# Patient Record
Sex: FEMALE | Race: WHITE | Employment: UNEMPLOYED | URBAN - METROPOLITAN AREA
[De-identification: names, ages, dates, MRNs, and addresses within clinical notes are randomized per-mention and may not be internally consistent; named-entity substitution may affect disease eponyms.]

---

## 2017-04-01 ENCOUNTER — HOSPITAL ENCOUNTER (OUTPATIENT)
Dept: BONE DENSITY | Age: 47
Discharge: HOME | End: 2017-04-01
Attending: FAMILY MEDICINE
Payer: COMMERCIAL

## 2017-04-01 ENCOUNTER — HOSPITAL ENCOUNTER (OUTPATIENT)
Dept: MAMMOGRAPHY | Age: 47
Discharge: HOME | End: 2017-04-01
Attending: FAMILY MEDICINE
Payer: COMMERCIAL

## 2017-04-01 DIAGNOSIS — Z13.820 SCREENING FOR OSTEOPOROSIS: ICD-10-CM

## 2017-04-01 DIAGNOSIS — Z12.31 ENCOUNTER FOR SCREENING MAMMOGRAM FOR MALIGNANT NEOPLASM OF BREAST: ICD-10-CM

## 2017-04-01 PROCEDURE — 77067 SCR MAMMO BI INCL CAD: CPT

## 2017-04-01 PROCEDURE — 77080 DXA BONE DENSITY AXIAL: CPT

## 2017-04-11 ENCOUNTER — HOSPITAL ENCOUNTER (OUTPATIENT)
Dept: ULTRASOUND IMAGING | Facility: HOSPITAL | Age: 47
Discharge: HOME | End: 2017-04-11
Attending: FAMILY MEDICINE
Payer: COMMERCIAL

## 2017-04-11 DIAGNOSIS — E04.1 THYROID NODULE: ICD-10-CM

## 2017-04-11 PROCEDURE — 76536 US EXAM OF HEAD AND NECK: CPT

## 2017-10-02 ENCOUNTER — LAB ENCOUNTER (OUTPATIENT)
Dept: LAB | Facility: HOSPITAL | Age: 47
End: 2017-10-02
Attending: FAMILY MEDICINE
Payer: COMMERCIAL

## 2017-10-02 DIAGNOSIS — E03.9 HYPOTHYROIDISM: Primary | ICD-10-CM

## 2017-10-02 PROCEDURE — 84443 ASSAY THYROID STIM HORMONE: CPT

## 2017-10-02 PROCEDURE — 84439 ASSAY OF FREE THYROXINE: CPT

## 2017-10-02 PROCEDURE — 36415 COLL VENOUS BLD VENIPUNCTURE: CPT

## 2018-02-13 ENCOUNTER — OFFICE VISIT (OUTPATIENT)
Dept: FAMILY MEDICINE CLINIC | Facility: CLINIC | Age: 48
End: 2018-02-13

## 2018-02-13 VITALS
SYSTOLIC BLOOD PRESSURE: 90 MMHG | BODY MASS INDEX: 26.68 KG/M2 | OXYGEN SATURATION: 97 % | WEIGHT: 145 LBS | HEART RATE: 85 BPM | DIASTOLIC BLOOD PRESSURE: 70 MMHG | HEIGHT: 62 IN

## 2018-02-13 DIAGNOSIS — E03.9 HYPOTHYROIDISM, UNSPECIFIED TYPE: Primary | ICD-10-CM

## 2018-02-13 DIAGNOSIS — Z01.89 ENCOUNTER FOR ROUTINE LABORATORY TESTING: ICD-10-CM

## 2018-02-13 PROCEDURE — 99202 OFFICE O/P NEW SF 15 MIN: CPT

## 2018-02-13 RX ORDER — LEVOTHYROXINE SODIUM 0.05 MG/1
50 TABLET ORAL
Qty: 30 TABLET | Refills: 0 | Status: SHIPPED | OUTPATIENT
Start: 2018-02-13 | End: 2018-03-14

## 2018-02-13 RX ORDER — LEVOTHYROXINE SODIUM 0.05 MG/1
50 TABLET ORAL
Refills: 5 | COMMUNITY
Start: 2017-12-20 | End: 2018-02-13

## 2018-02-14 NOTE — PROGRESS NOTES
HPI:    Patient ID: Ruben Neal is a 49year old female. Thyroid Problem   This is a chronic problem. The current episode started more than 1 year ago (5/15/15). The problem occurs constantly. Progression since onset: stable.  Associated symptoms incl regular rhythm and normal heart sounds. Pulmonary/Chest: Effort normal and breath sounds normal. No respiratory distress. Neurological: She is alert and oriented to person, place, and time. Skin: Skin is warm. No rash noted.    Nursing note and vital

## 2018-02-14 NOTE — PATIENT INSTRUCTIONS
Hipotiroidismo    A usted le fitzpatrick diagnosticado hipotiroidismo. Triumph significa que pinzon glándula tiroides no está produciendo suficiente hormona tiroidea. Esta hormona es importante para el crecimiento del cuerpo y el metabolismo.  Si no tiene suficiente, mu · [place] mattie pastillas de hormona tiroidea exactamente kai le indicó pinzon proveedor de [place]. En la [employer] de los casos es derick pastilla por día con el estómago vacío.  Use un pastillero con los días de la semana para ayudarse a recordar guy pinzon pas © 8236-2743 [address]. [address] [facility], [address] [facility]. Todos los derechos reservados. Esta información no pretende sustituir la atención médica profesional. Sólo pinzon médico puede diagnosticar y tratar un problema de ellen.

## 2018-03-01 ENCOUNTER — APPOINTMENT (OUTPATIENT)
Dept: LAB | Facility: HOSPITAL | Age: 48
End: 2018-03-01
Payer: COMMERCIAL

## 2018-03-01 DIAGNOSIS — E03.9 HYPOTHYROIDISM, UNSPECIFIED TYPE: ICD-10-CM

## 2018-03-01 DIAGNOSIS — Z01.89 ENCOUNTER FOR ROUTINE LABORATORY TESTING: ICD-10-CM

## 2018-03-01 LAB
ALBUMIN SERPL BCP-MCNC: 4 G/DL (ref 3.5–4.8)
ALBUMIN/GLOB SERPL: 1.3 {RATIO} (ref 1–2)
ALP SERPL-CCNC: 90 U/L (ref 32–100)
ALT SERPL-CCNC: 13 U/L (ref 14–54)
ANION GAP SERPL CALC-SCNC: 5 MMOL/L (ref 0–18)
AST SERPL-CCNC: 20 U/L (ref 15–41)
BILIRUB SERPL-MCNC: 1 MG/DL (ref 0.3–1.2)
BILIRUB UR QL: NEGATIVE
BUN SERPL-MCNC: 10 MG/DL (ref 8–20)
BUN/CREAT SERPL: 17.5 (ref 10–20)
CALCIUM SERPL-MCNC: 9.2 MG/DL (ref 8.5–10.5)
CHLORIDE SERPL-SCNC: 105 MMOL/L (ref 95–110)
CHOLEST SERPL-MCNC: 227 MG/DL (ref 110–200)
CLARITY UR: CLEAR
CO2 SERPL-SCNC: 27 MMOL/L (ref 22–32)
COLOR UR: YELLOW
CREAT SERPL-MCNC: 0.57 MG/DL (ref 0.5–1.5)
ERYTHROCYTE [DISTWIDTH] IN BLOOD BY AUTOMATED COUNT: 13.3 % (ref 11–15)
GLOBULIN PLAS-MCNC: 3 G/DL (ref 2.5–3.7)
GLUCOSE SERPL-MCNC: 99 MG/DL (ref 70–99)
GLUCOSE UR-MCNC: NEGATIVE MG/DL
HCT VFR BLD AUTO: 40.4 % (ref 35–48)
HDLC SERPL-MCNC: 69 MG/DL
HGB BLD-MCNC: 13.3 G/DL (ref 12–16)
HGB UR QL STRIP.AUTO: NEGATIVE
KETONES UR-MCNC: NEGATIVE MG/DL
LDLC SERPL CALC-MCNC: 137 MG/DL (ref 0–99)
MCH RBC QN AUTO: 29.8 PG (ref 27–32)
MCHC RBC AUTO-ENTMCNC: 33 G/DL (ref 32–37)
MCV RBC AUTO: 90.5 FL (ref 80–100)
NITRITE UR QL STRIP.AUTO: NEGATIVE
NONHDLC SERPL-MCNC: 158 MG/DL
OSMOLALITY UR CALC.SUM OF ELEC: 283 MOSM/KG (ref 275–295)
PATIENT FASTING: YES
PH UR: 6 [PH] (ref 5–8)
PLATELET # BLD AUTO: 240 K/UL (ref 140–400)
PMV BLD AUTO: 8.4 FL (ref 7.4–10.3)
POTASSIUM SERPL-SCNC: 4.1 MMOL/L (ref 3.3–5.1)
PROT SERPL-MCNC: 7 G/DL (ref 5.9–8.4)
PROT UR-MCNC: NEGATIVE MG/DL
RBC # BLD AUTO: 4.46 M/UL (ref 3.7–5.4)
RBC #/AREA URNS AUTO: 6 /HPF
SODIUM SERPL-SCNC: 137 MMOL/L (ref 136–144)
SP GR UR STRIP: 1.02 (ref 1–1.03)
TRIGL SERPL-MCNC: 103 MG/DL (ref 1–149)
TSH SERPL-ACNC: 4.25 UIU/ML (ref 0.45–5.33)
UROBILINOGEN UR STRIP-ACNC: <2
VIT C UR-MCNC: NEGATIVE MG/DL
WBC # BLD AUTO: 5.3 K/UL (ref 4–11)
WBC #/AREA URNS AUTO: 29 /HPF

## 2018-03-01 PROCEDURE — 36415 COLL VENOUS BLD VENIPUNCTURE: CPT

## 2018-03-01 PROCEDURE — 80061 LIPID PANEL: CPT

## 2018-03-01 PROCEDURE — 81001 URINALYSIS AUTO W/SCOPE: CPT

## 2018-03-01 PROCEDURE — 80053 COMPREHEN METABOLIC PANEL: CPT

## 2018-03-01 PROCEDURE — 85027 COMPLETE CBC AUTOMATED: CPT

## 2018-03-01 PROCEDURE — 84443 ASSAY THYROID STIM HORMONE: CPT

## 2018-03-12 ENCOUNTER — TELEPHONE (OUTPATIENT)
Dept: FAMILY MEDICINE CLINIC | Facility: CLINIC | Age: 48
End: 2018-03-12

## 2018-03-12 DIAGNOSIS — E03.9 HYPOTHYROIDISM, UNSPECIFIED TYPE: ICD-10-CM

## 2018-03-12 RX ORDER — LEVOTHYROXINE SODIUM 0.05 MG/1
TABLET ORAL
Qty: 30 TABLET | Refills: 0 | OUTPATIENT
Start: 2018-03-12

## 2018-03-12 NOTE — TELEPHONE ENCOUNTER
----- Message from Olga Barlow MD sent at 3/3/2018 10:30 AM CST -----  Needs f/u appointment to review results and annual physical exam.

## 2018-03-14 ENCOUNTER — OFFICE VISIT (OUTPATIENT)
Dept: FAMILY MEDICINE CLINIC | Facility: CLINIC | Age: 48
End: 2018-03-14

## 2018-03-14 VITALS
HEART RATE: 69 BPM | WEIGHT: 140 LBS | DIASTOLIC BLOOD PRESSURE: 66 MMHG | OXYGEN SATURATION: 98 % | SYSTOLIC BLOOD PRESSURE: 94 MMHG | BODY MASS INDEX: 26 KG/M2

## 2018-03-14 DIAGNOSIS — Z13.31 DEPRESSION SCREENING: ICD-10-CM

## 2018-03-14 DIAGNOSIS — Z12.31 ENCOUNTER FOR SCREENING MAMMOGRAM FOR BREAST CANCER: ICD-10-CM

## 2018-03-14 DIAGNOSIS — E03.9 HYPOTHYROIDISM, UNSPECIFIED TYPE: ICD-10-CM

## 2018-03-14 DIAGNOSIS — E78.00 HYPERCHOLESTEREMIA: ICD-10-CM

## 2018-03-14 DIAGNOSIS — R14.0 ABDOMINAL DISTENSION (GASEOUS): ICD-10-CM

## 2018-03-14 DIAGNOSIS — E66.3 OVERWEIGHT: ICD-10-CM

## 2018-03-14 DIAGNOSIS — Z01.411 ENCOUNTER FOR GYNECOLOGICAL EXAMINATION WITH ABNORMAL FINDING: Primary | ICD-10-CM

## 2018-03-14 PROCEDURE — 99212 OFFICE O/P EST SF 10 MIN: CPT

## 2018-03-14 PROCEDURE — 87624 HPV HI-RISK TYP POOLED RSLT: CPT

## 2018-03-14 PROCEDURE — 99396 PREV VISIT EST AGE 40-64: CPT

## 2018-03-14 RX ORDER — DICYCLOMINE HYDROCHLORIDE 10 MG/1
10 CAPSULE ORAL 4 TIMES DAILY PRN
Qty: 90 CAPSULE | Refills: 0 | Status: SHIPPED | OUTPATIENT
Start: 2018-03-14 | End: 2019-04-12 | Stop reason: ALTCHOICE

## 2018-03-14 RX ORDER — LEVOTHYROXINE SODIUM 0.05 MG/1
50 TABLET ORAL
Qty: 90 TABLET | Refills: 1 | Status: SHIPPED | OUTPATIENT
Start: 2018-03-14 | End: 2018-08-28

## 2018-03-14 NOTE — PROGRESS NOTES
CC: Annual Physical Exam    HPI:   Lynn Degroot is a 49year old female who presents for a complete physical exam. Symptoms: denies discharge, itching, burning or dysuria, is menopausal. Patient complains of intermittent episodes of bloating for the past 149 mg/dL   Non HDL Chol 158 (H) <130 mg/dL   LDL Cholesterol 137 (H) 0 - 99 mg/dL   -TSH W REFLEX TO FREE T4   Result Value Ref Range   TSH 4.25 0.45 - 5.33 uIU/mL   -URINALYSIS, ROUTINE   Result Value Ref Range   Urine Color Yellow Yellow   Clarity Urine Smokeless tobacco: Never Used                      Alcohol use: No              Occ: housewife. : yes. Children: 2 daughters and 1 son.    Exercise: minimal.  Diet: watches minimally     REVIEW OF SYSTEMS:   CONSTITUTIONAL:  Denies unusual we apparent distress. HEENT:  Head:  Normocephalic, atraumatic Eyes: EOMI, PERRLA, no scleral icterus, conjunctivae clear bilaterally, no eye discharge Ears: TM's clear and intact bilaterally, no excess cerumen or erythema.  Nose: patent, no nasal discharge T Clinical course, prognosis, and treatment options of disease process discussed with pt.  - Dicyclomine HCl (BENTYL) 10 MG Oral Cap; Take 1 capsule (10 mg total) by mouth 4 (four) times daily as needed. Dispense: 90 capsule; Refill: 0    3.  Hypercholestere

## 2018-03-15 NOTE — PATIENT INSTRUCTIONS
Pautas de prevención para mujeres de entre 36 y 52 años de edad  [address] detección y las vacunas son importantes para el manejo de pinzon ellen.  La consejería sobre pinzon ellen también es esencial. A continuación, verá pautas en relación con esos temas p Clamidia Todas las mujeres con mayor riesgo de infección En los exámenes de henny si tiene riesgo o si tiene síntomas   Depresión Todas las mujeres de ann agueda de edad En los exámenes de henny   [address] sexualmente activas con mayor riesgo Hepatitis B Las mujeres con mayor riesgo de infección; hable con pinzon proveedor de [employer] dosis a lo matias de seis meses; la segunda dosis debería aplicarse un mes después de la primera dosis; la tercera dosis debería aplicarse al [place] Uso del tabaco y los efectos que puede tener sobre la ellen Todas las mujeres de ann agueda de edad Todos los exámenes   1American Diabetes Association  2American Cancer Society  3U. S.  Preventive Services Task Force  [address] Academy of Ophthalmology  [place]

## 2018-03-16 LAB — HPV I/H RISK 1 DNA SPEC QL NAA+PROBE: NEGATIVE

## 2018-04-03 ENCOUNTER — HOSPITAL ENCOUNTER (OUTPATIENT)
Dept: MAMMOGRAPHY | Facility: HOSPITAL | Age: 48
Discharge: HOME | End: 2018-04-03
Payer: COMMERCIAL

## 2018-04-03 DIAGNOSIS — Z12.31 ENCOUNTER FOR SCREENING MAMMOGRAM FOR BREAST CANCER: ICD-10-CM

## 2018-04-03 PROCEDURE — 77067 SCR MAMMO BI INCL CAD: CPT

## 2018-07-26 ENCOUNTER — OFFICE VISIT (OUTPATIENT)
Dept: FAMILY MEDICINE CLINIC | Facility: CLINIC | Age: 48
End: 2018-07-26
Payer: COMMERCIAL

## 2018-07-26 VITALS
WEIGHT: 143 LBS | OXYGEN SATURATION: 98 % | DIASTOLIC BLOOD PRESSURE: 74 MMHG | SYSTOLIC BLOOD PRESSURE: 106 MMHG | HEART RATE: 63 BPM | BODY MASS INDEX: 26 KG/M2

## 2018-07-26 DIAGNOSIS — M54.42 CHRONIC LEFT-SIDED LOW BACK PAIN WITH LEFT-SIDED SCIATICA: Primary | ICD-10-CM

## 2018-07-26 DIAGNOSIS — K13.79 LESION OF HARD PALATE: ICD-10-CM

## 2018-07-26 DIAGNOSIS — G89.29 CHRONIC LEFT-SIDED LOW BACK PAIN WITH LEFT-SIDED SCIATICA: Primary | ICD-10-CM

## 2018-07-26 DIAGNOSIS — F51.04 PSYCHOPHYSIOLOGICAL INSOMNIA: ICD-10-CM

## 2018-07-26 PROBLEM — Z12.31 ENCOUNTER FOR SCREENING MAMMOGRAM FOR BREAST CANCER: Status: RESOLVED | Noted: 2018-03-14 | Resolved: 2018-07-26

## 2018-07-26 PROBLEM — Z13.31 DEPRESSION SCREENING: Status: RESOLVED | Noted: 2018-03-14 | Resolved: 2018-07-26

## 2018-07-26 PROBLEM — Z01.411 ENCOUNTER FOR GYNECOLOGICAL EXAMINATION WITH ABNORMAL FINDING: Status: RESOLVED | Noted: 2018-03-14 | Resolved: 2018-07-26

## 2018-07-26 PROCEDURE — 99214 OFFICE O/P EST MOD 30 MIN: CPT

## 2018-07-26 RX ORDER — NAPROXEN 500 MG/1
500 TABLET ORAL 2 TIMES DAILY WITH MEALS
Qty: 60 TABLET | Refills: 0 | Status: SHIPPED | OUTPATIENT
Start: 2018-07-26 | End: 2019-06-20

## 2018-07-26 RX ORDER — CYCLOBENZAPRINE HCL 10 MG
10 TABLET ORAL 3 TIMES DAILY PRN
Qty: 15 TABLET | Refills: 0 | Status: SHIPPED | OUTPATIENT
Start: 2018-07-26 | End: 2019-06-20 | Stop reason: ALTCHOICE

## 2018-07-27 NOTE — PROGRESS NOTES
HPI:    Patient ID: June Herndon is a 51year old female. Also c/o trouble falling and staying asleep for the past 2 months following the death of her parents.  She states that prior to this she spent the past few years taking care of them and not slee unexpected weight change and weight loss. HENT: Negative for congestion, ear pain, rhinorrhea and sore throat. Eyes: Negative for photophobia, discharge and visual disturbance. Respiratory: Negative for cough and shortness of breath.     Concetta Benitez erythematous and raised oval shaped area on mid anterior aspect of hard palate) present. Cardiovascular: Normal rate, regular rhythm and normal heart sounds. Pulmonary/Chest: Effort normal and breath sounds normal. No respiratory distress.    [address]

## 2018-07-27 NOTE — PATIENT INSTRUCTIONS
El Insomnio [Insomnia]  El insomnio se refiere a dificultad para dormirse, mantenerse dormido o ambos.  El insomnio tiene muchas causas, incluyendo ansiedad, estrés, depresión, dolor crónico, ciclos de sueño fuera de equilibrio debido a trabajo de turno n Seguimiento con pinzon médico o de acuerdo a las indicaciones de nuestro personal si siente que el insomnio no responde a las indicaciones descritas arriba.   Busque Prontamente Atención Médica  si algo de lo siguiente ocurre:  · Inquietud extrema o irritabilid

## 2018-08-27 ENCOUNTER — NURSE ONLY (OUTPATIENT)
Dept: FAMILY MEDICINE CLINIC | Facility: CLINIC | Age: 48
End: 2018-08-27
Payer: COMMERCIAL

## 2018-08-27 DIAGNOSIS — E78.00 HYPERCHOLESTEREMIA: ICD-10-CM

## 2018-08-27 DIAGNOSIS — E03.9 HYPOTHYROIDISM, UNSPECIFIED TYPE: ICD-10-CM

## 2018-08-27 LAB
ALBUMIN SERPL BCP-MCNC: 4 G/DL (ref 3.5–4.8)
ALBUMIN/GLOB SERPL: 1.5 {RATIO} (ref 1–2)
ALP SERPL-CCNC: 85 U/L (ref 32–100)
ALT SERPL-CCNC: 13 U/L (ref 14–54)
ANION GAP SERPL CALC-SCNC: 6 MMOL/L (ref 0–18)
AST SERPL-CCNC: 21 U/L (ref 15–41)
BILIRUB SERPL-MCNC: 0.8 MG/DL (ref 0.3–1.2)
BUN SERPL-MCNC: 11 MG/DL (ref 8–20)
BUN/CREAT SERPL: 19.6 (ref 10–20)
CALCIUM SERPL-MCNC: 8.7 MG/DL (ref 8.5–10.5)
CHLORIDE SERPL-SCNC: 104 MMOL/L (ref 95–110)
CHOLEST SERPL-MCNC: 231 MG/DL (ref 110–200)
CO2 SERPL-SCNC: 23 MMOL/L (ref 22–32)
CREAT SERPL-MCNC: 0.56 MG/DL (ref 0.5–1.5)
GLOBULIN PLAS-MCNC: 2.7 G/DL (ref 2.5–3.7)
GLUCOSE SERPL-MCNC: 99 MG/DL (ref 70–99)
HDLC SERPL-MCNC: 77 MG/DL
LDLC SERPL CALC-MCNC: 133 MG/DL (ref 0–99)
NONHDLC SERPL-MCNC: 154 MG/DL
OSMOLALITY UR CALC.SUM OF ELEC: 275 MOSM/KG (ref 275–295)
PATIENT FASTING: YES
POTASSIUM SERPL-SCNC: 3.8 MMOL/L (ref 3.3–5.1)
PROT SERPL-MCNC: 6.7 G/DL (ref 5.9–8.4)
SODIUM SERPL-SCNC: 133 MMOL/L (ref 136–144)
TRIGL SERPL-MCNC: 103 MG/DL (ref 1–149)
TSH SERPL-ACNC: 1.5 UIU/ML (ref 0.45–5.33)

## 2018-08-27 PROCEDURE — 84443 ASSAY THYROID STIM HORMONE: CPT

## 2018-08-27 PROCEDURE — 80061 LIPID PANEL: CPT

## 2018-08-27 PROCEDURE — 36415 COLL VENOUS BLD VENIPUNCTURE: CPT

## 2018-08-27 PROCEDURE — 80053 COMPREHEN METABOLIC PANEL: CPT

## 2018-08-27 NOTE — PROGRESS NOTES
Patient presented to clinic for Lipid, TSH blood work. Orders verified in patient chart. Name and  verified. Patient is fasting. Blood drawn from the left dorsal hand, tolerated well without complications.

## 2018-11-26 ENCOUNTER — APPOINTMENT (OUTPATIENT)
Dept: LAB | Facility: HOSPITAL | Age: 48
End: 2018-11-26
Payer: COMMERCIAL

## 2018-11-26 DIAGNOSIS — E78.00 HYPERCHOLESTEREMIA: ICD-10-CM

## 2018-11-26 PROCEDURE — 80053 COMPREHEN METABOLIC PANEL: CPT

## 2018-11-26 PROCEDURE — 80061 LIPID PANEL: CPT

## 2018-11-26 PROCEDURE — 36415 COLL VENOUS BLD VENIPUNCTURE: CPT

## 2018-11-27 DIAGNOSIS — E78.00 HYPERCHOLESTEREMIA: ICD-10-CM

## 2018-11-27 PROBLEM — R73.01 IMPAIRED FASTING GLUCOSE: Status: ACTIVE | Noted: 2018-11-26

## 2018-11-27 RX ORDER — SIMVASTATIN 20 MG
20 TABLET ORAL NIGHTLY
Qty: 90 TABLET | Refills: 2 | Status: SHIPPED | OUTPATIENT
Start: 2018-11-27 | End: 2019-03-11

## 2019-03-04 ENCOUNTER — TELEPHONE (OUTPATIENT)
Dept: FAMILY MEDICINE CLINIC | Facility: CLINIC | Age: 49
End: 2019-03-04

## 2019-03-04 DIAGNOSIS — E03.9 HYPOTHYROIDISM, UNSPECIFIED TYPE: ICD-10-CM

## 2019-03-04 RX ORDER — LEVOTHYROXINE SODIUM 0.05 MG/1
50 TABLET ORAL
Qty: 10 TABLET | Refills: 0 | Status: SHIPPED | OUTPATIENT
Start: 2019-03-04 | End: 2019-03-11

## 2019-03-04 NOTE — TELEPHONE ENCOUNTER
Patient is calling asking for a refill on her thyroid medication for 1 week. She states she is waiting on her insurance card to come in. Once received will come in to get blood work and follow up with Dr. Karen Gruber.

## 2019-03-11 ENCOUNTER — TELEPHONE (OUTPATIENT)
Dept: FAMILY MEDICINE CLINIC | Facility: CLINIC | Age: 49
End: 2019-03-11

## 2019-03-11 DIAGNOSIS — E78.00 HYPERCHOLESTEREMIA: ICD-10-CM

## 2019-03-11 DIAGNOSIS — E03.9 HYPOTHYROIDISM, UNSPECIFIED TYPE: ICD-10-CM

## 2019-03-11 RX ORDER — LEVOTHYROXINE SODIUM 0.05 MG/1
50 TABLET ORAL
Qty: 30 TABLET | Refills: 0 | Status: SHIPPED | OUTPATIENT
Start: 2019-03-11 | End: 2019-04-12

## 2019-03-11 RX ORDER — SIMVASTATIN 20 MG
20 TABLET ORAL NIGHTLY
Qty: 30 TABLET | Refills: 0 | Status: SHIPPED | OUTPATIENT
Start: 2019-03-11 | End: 2019-04-10

## 2019-03-11 NOTE — TELEPHONE ENCOUNTER
Patient is calling stating she called her insurance to verify when she will be active. She was told it started April 1st. Patient is requesting thyroid and cholesterol refills for 1 month if possible.

## 2019-03-11 NOTE — TELEPHONE ENCOUNTER
1 month supply authorized by MD, sufficient until her insurance becomes active April 1st.  E-rx to pharmacy.

## 2019-04-03 ENCOUNTER — TELEPHONE (OUTPATIENT)
Dept: FAMILY MEDICINE CLINIC | Facility: CLINIC | Age: 49
End: 2019-04-03

## 2019-04-03 DIAGNOSIS — Z00.00 LABORATORY EXAMINATION ORDERED AS PART OF A ROUTINE GENERAL MEDICAL EXAMINATION: Primary | ICD-10-CM

## 2019-04-03 DIAGNOSIS — E03.9 HYPOTHYROIDISM, UNSPECIFIED TYPE: ICD-10-CM

## 2019-04-03 DIAGNOSIS — E78.00 HYPERCHOLESTEREMIA: ICD-10-CM

## 2019-04-03 NOTE — TELEPHONE ENCOUNTER
Orders entered.   Per patient's insurance, she needs to go to [facility] to get the labs drawn -- will need to cancel appt for the nurse visit on 04/09/19 and instead  lab orders from the office and bring it to [facility].

## 2019-04-04 ENCOUNTER — TELEPHONE (OUTPATIENT)
Dept: FAMILY MEDICINE CLINIC | Facility: CLINIC | Age: 49
End: 2019-04-04

## 2019-04-04 NOTE — TELEPHONE ENCOUNTER
Patient informed, will double check with insurance. States she has never gotten a bill when she would go to us or to [facility] lab downstairs.   States she will get blood work done when she comes back from [facility] (rescheduled appt; and requesting another month's

## 2019-04-04 NOTE — TELEPHONE ENCOUNTER
Patient is calling asking for a refill on her medications for a month.  She stating she will be going out of town on Monday and when she returns she will get labs done and make appointment for physical. Please advice her once medications have been sent to [facility]

## 2019-04-05 NOTE — TELEPHONE ENCOUNTER
Patient informed that refills were denied. Pt states she cancelled her trip and will now be doing the blood work and re-scheduling to see Dr. Ramiro Villatoro.

## 2019-04-06 DIAGNOSIS — E03.9 HYPOTHYROIDISM, UNSPECIFIED TYPE: ICD-10-CM

## 2019-04-06 DIAGNOSIS — E78.00 HYPERCHOLESTEREMIA: ICD-10-CM

## 2019-04-09 RX ORDER — LEVOTHYROXINE SODIUM 0.05 MG/1
TABLET ORAL
Qty: 30 TABLET | Refills: 0 | OUTPATIENT
Start: 2019-04-09

## 2019-04-09 RX ORDER — SIMVASTATIN 20 MG
TABLET ORAL
Qty: 30 TABLET | Refills: 0 | OUTPATIENT
Start: 2019-04-09

## 2019-04-12 ENCOUNTER — OFFICE VISIT (OUTPATIENT)
Dept: FAMILY MEDICINE CLINIC | Facility: CLINIC | Age: 49
End: 2019-04-12
Payer: COMMERCIAL

## 2019-04-12 VITALS
HEART RATE: 55 BPM | OXYGEN SATURATION: 98 % | DIASTOLIC BLOOD PRESSURE: 70 MMHG | WEIGHT: 146 LBS | SYSTOLIC BLOOD PRESSURE: 108 MMHG | BODY MASS INDEX: 25.87 KG/M2 | HEIGHT: 63 IN

## 2019-04-12 DIAGNOSIS — Z00.01 ENCOUNTER FOR ROUTINE ADULT HEALTH EXAMINATION WITH ABNORMAL FINDINGS: Primary | ICD-10-CM

## 2019-04-12 DIAGNOSIS — M54.42 CHRONIC LEFT-SIDED LOW BACK PAIN WITH LEFT-SIDED SCIATICA: ICD-10-CM

## 2019-04-12 DIAGNOSIS — E66.3 OVERWEIGHT (BMI 25.0-29.9): ICD-10-CM

## 2019-04-12 DIAGNOSIS — R74.8 ELEVATED ALKALINE PHOSPHATASE MEASUREMENT: ICD-10-CM

## 2019-04-12 DIAGNOSIS — Z13.31 DEPRESSION SCREENING: ICD-10-CM

## 2019-04-12 DIAGNOSIS — Z12.12 ENCOUNTER FOR COLORECTAL CANCER SCREENING: ICD-10-CM

## 2019-04-12 DIAGNOSIS — E03.9 HYPOTHYROIDISM, UNSPECIFIED TYPE: ICD-10-CM

## 2019-04-12 DIAGNOSIS — G89.29 CHRONIC RIGHT SHOULDER PAIN: ICD-10-CM

## 2019-04-12 DIAGNOSIS — E78.00 HYPERCHOLESTEREMIA: ICD-10-CM

## 2019-04-12 DIAGNOSIS — Z12.11 ENCOUNTER FOR COLORECTAL CANCER SCREENING: ICD-10-CM

## 2019-04-12 DIAGNOSIS — G89.29 CHRONIC LEFT-SIDED LOW BACK PAIN WITH LEFT-SIDED SCIATICA: ICD-10-CM

## 2019-04-12 DIAGNOSIS — Z12.31 ENCOUNTER FOR SCREENING MAMMOGRAM FOR BREAST CANCER: ICD-10-CM

## 2019-04-12 DIAGNOSIS — M25.511 CHRONIC RIGHT SHOULDER PAIN: ICD-10-CM

## 2019-04-12 PROBLEM — R14.0 ABDOMINAL DISTENSION (GASEOUS): Status: RESOLVED | Noted: 2018-03-14 | Resolved: 2019-04-12

## 2019-04-12 PROBLEM — F51.04 PSYCHOPHYSIOLOGICAL INSOMNIA: Status: RESOLVED | Noted: 2018-07-26 | Resolved: 2019-04-12

## 2019-04-12 PROBLEM — K13.79 LESION OF HARD PALATE: Status: RESOLVED | Noted: 2018-07-26 | Resolved: 2019-04-12

## 2019-04-12 PROBLEM — R73.01 IMPAIRED FASTING GLUCOSE: Status: RESOLVED | Noted: 2018-11-26 | Resolved: 2019-04-12

## 2019-04-12 PROCEDURE — 99396 PREV VISIT EST AGE 40-64: CPT

## 2019-04-12 RX ORDER — METHYLPREDNISOLONE 4 MG/1
TABLET ORAL
Qty: 1 KIT | Refills: 0 | Status: SHIPPED | OUTPATIENT
Start: 2019-04-12 | End: 2019-06-20 | Stop reason: ALTCHOICE

## 2019-04-12 RX ORDER — LEVOTHYROXINE SODIUM 0.07 MG/1
75 TABLET ORAL
Qty: 60 TABLET | Refills: 0 | Status: SHIPPED | OUTPATIENT
Start: 2019-04-12 | End: 2019-06-19

## 2019-04-12 NOTE — PROGRESS NOTES
CC: Annual Physical Exam    HPI:   Shona Carlson is a 51year old female who presents for a complete physical exam. Symptoms: denies discharge, itching, burning or dysuria, is menopausal. Patient denies any acute complaints at this time.      [place]t [place] f daughters and 1 son. Exercise: minimal.  Diet: watches minimally     REVIEW OF SYSTEMS:   CONSTITUTIONAL:  Denies unusual weight gain/loss, fever, chills, or fatigue.   EENT:  Eyes:  Denies eye pain, visual loss, blurred vision, double vision or yellow sc discharge Ears: TM's clear and intact bilaterally, no excess cerumen or erythema. Nose: patent, no nasal discharge Throat:  No tonsillar erythema or exudate. Mouth:  No oral lesions or ulcerations, good dentition.   NECK: Supple, no CLAD, no JVD, no caroti Hypothyroidism, unspecified type  - Levothyroxine increased to 75mcg PO daily.  - Repeat TSH/free T4 in 2 months. 4. Hypercholesteremia   - Omega 3 fish oil pills 2-4g PO daily recommended.     5. Chronic right shoulder pain  - RICE (Rest, Icing, Jose

## 2019-04-12 NOTE — PATIENT INSTRUCTIONS
Pautas de prevención para mujeres de entre 36 y 52 años de edad  [address] detección y las vacunas son importantes para el manejo de pinzon ellen.  La consejería sobre pinzon ellen también es esencial. A continuación, verá pautas en relación con esos temas p Clamidia Todas las mujeres con mayor riesgo de infección En los exámenes de henny si tiene riesgo o si tiene síntomas   Depresión Todas las mujeres de ann agueda de edad En los exámenes de henny   [address] sexualmente activas con mayor riesgo Hepatitis B Las mujeres con mayor riesgo de infección; hable con pinzon proveedor de [employer] dosis a lo matias de seis meses; la segunda dosis debería aplicarse un mes después de la primera dosis; la tercera dosis debería aplicarse al [place] Uso del tabaco y los efectos que puede tener sobre la ellen Todas las mujeres de ann agueda de edad Todos los exámenes   1American Diabetes Association  2American Cancer Society  3U. S.  Preventive Services Task Force  [address] Academy of Ophthalmology  [place]

## 2019-05-11 ENCOUNTER — HOSPITAL ENCOUNTER (OUTPATIENT)
Dept: MAMMOGRAPHY | Facility: HOSPITAL | Age: 49
Discharge: HOME | End: 2019-05-11
Payer: COMMERCIAL

## 2019-05-11 DIAGNOSIS — Z12.31 ENCOUNTER FOR SCREENING MAMMOGRAM FOR BREAST CANCER: ICD-10-CM

## 2019-05-11 PROCEDURE — 77067 SCR MAMMO BI INCL CAD: CPT

## 2019-05-11 PROCEDURE — 77063 BREAST TOMOSYNTHESIS BI: CPT

## 2019-06-14 DIAGNOSIS — E03.9 HYPOTHYROIDISM, UNSPECIFIED TYPE: ICD-10-CM

## 2019-06-17 ENCOUNTER — TELEPHONE (OUTPATIENT)
Dept: FAMILY MEDICINE CLINIC | Facility: CLINIC | Age: 49
End: 2019-06-17

## 2019-06-17 RX ORDER — LEVOTHYROXINE SODIUM 0.07 MG/1
TABLET ORAL
Qty: 60 TABLET | Refills: 0 | OUTPATIENT
Start: 2019-06-17

## 2019-06-17 NOTE — TELEPHONE ENCOUNTER
Refills to be authorized during her office visit 06/20/19. Patient informed and agrees with the plan.   I also requested labs form [facility].

## 2019-06-19 DIAGNOSIS — E03.9 HYPOTHYROIDISM, UNSPECIFIED TYPE: ICD-10-CM

## 2019-06-19 RX ORDER — LEVOTHYROXINE SODIUM 0.07 MG/1
75 TABLET ORAL
Qty: 90 TABLET | Refills: 0 | Status: SHIPPED | OUTPATIENT
Start: 2019-06-19 | End: 2019-10-14

## 2019-06-20 ENCOUNTER — OFFICE VISIT (OUTPATIENT)
Dept: FAMILY MEDICINE CLINIC | Facility: CLINIC | Age: 49
End: 2019-06-20
Payer: COMMERCIAL

## 2019-06-20 VITALS
DIASTOLIC BLOOD PRESSURE: 68 MMHG | SYSTOLIC BLOOD PRESSURE: 100 MMHG | WEIGHT: 148 LBS | HEART RATE: 77 BPM | BODY MASS INDEX: 26 KG/M2 | OXYGEN SATURATION: 98 %

## 2019-06-20 DIAGNOSIS — E03.9 HYPOTHYROIDISM, UNSPECIFIED TYPE: ICD-10-CM

## 2019-06-20 DIAGNOSIS — M54.42 CHRONIC LEFT-SIDED LOW BACK PAIN WITH LEFT-SIDED SCIATICA: ICD-10-CM

## 2019-06-20 DIAGNOSIS — G89.29 CHRONIC RIGHT SHOULDER PAIN: ICD-10-CM

## 2019-06-20 DIAGNOSIS — G89.29 CHRONIC LEFT-SIDED LOW BACK PAIN WITH LEFT-SIDED SCIATICA: ICD-10-CM

## 2019-06-20 DIAGNOSIS — M25.511 CHRONIC RIGHT SHOULDER PAIN: ICD-10-CM

## 2019-06-20 DIAGNOSIS — E03.9 HYPOTHYROIDISM, UNSPECIFIED TYPE: Primary | ICD-10-CM

## 2019-06-20 PROBLEM — Z13.31 DEPRESSION SCREENING: Status: RESOLVED | Noted: 2018-03-14 | Resolved: 2019-06-20

## 2019-06-20 PROBLEM — R74.8 ELEVATED ALKALINE PHOSPHATASE MEASUREMENT: Status: RESOLVED | Noted: 2019-04-10 | Resolved: 2019-06-20

## 2019-06-20 PROBLEM — Z00.01 ENCOUNTER FOR ROUTINE ADULT HEALTH EXAMINATION WITH ABNORMAL FINDINGS: Status: RESOLVED | Noted: 2018-03-14 | Resolved: 2019-06-20

## 2019-06-20 PROBLEM — Z12.31 ENCOUNTER FOR SCREENING MAMMOGRAM FOR BREAST CANCER: Status: RESOLVED | Noted: 2019-04-12 | Resolved: 2019-06-20

## 2019-06-20 PROCEDURE — 99213 OFFICE O/P EST LOW 20 MIN: CPT

## 2019-06-20 RX ORDER — NAPROXEN 500 MG/1
500 TABLET ORAL 2 TIMES DAILY WITH MEALS
Qty: 60 TABLET | Refills: 2 | Status: SHIPPED | OUTPATIENT
Start: 2019-06-20 | End: 2019-12-26

## 2019-06-21 NOTE — PATIENT INSTRUCTIONS
El tratamiento de los problemas de tiroides    Morales médico le diagnosticó un trastorno de esta glándula y colaborará con usted para diseñar un plan de tratamiento. Aunque no tenga síntomas, es importante que busque la [facility].  Los siguientes son Se presenta cuando la tiroides tiene [employer] de la normal. Los karthik tratamientos principales para el hipertiroidismo pueden administrarse por sí solos o junto con bloqueadores beta (medicamentos que pueden reducir los síntomas causados por el [employer] · Cirugía para tratar los nódulos malignos o benignos que están provocando los síntomas (por ejemplo, ahogo o dificultad para tragar). La operación consiste en la extracción total o parcial de pinzon glándula tiroides.  Si la cirugía se hizo para extirpar nódul

## 2019-06-21 NOTE — PROGRESS NOTES
HPI:    Patient ID: Josefina Cantu is a 47year old female. Thyroid Problem   This is a chronic problem. The current episode started more than 1 year ago (5/15/15). The problem occurs constantly. Progression since onset: stable.  Associated symptoms incl Exam   Constitutional: She is oriented to person, place, and time. She appears well-developed and well-nourished. No distress. Cardiovascular: Normal rate, regular rhythm and normal heart sounds.    Pulmonary/Chest: Effort normal and breath sounds normal.

## 2019-06-24 ENCOUNTER — TELEPHONE (OUTPATIENT)
Dept: FAMILY MEDICINE CLINIC | Facility: CLINIC | Age: 49
End: 2019-06-24

## 2019-06-24 RX ORDER — LEVOTHYROXINE SODIUM 0.07 MG/1
TABLET ORAL
Qty: 60 TABLET | Refills: 0 | OUTPATIENT
Start: 2019-06-24

## 2019-06-24 NOTE — TELEPHONE ENCOUNTER
Patient called regarding her thyroid medication, patient states she spoke to the pharmacy and they are stating they never received a prescription for this medication from Dr. Delvin Patiño office.

## 2019-06-24 NOTE — TELEPHONE ENCOUNTER
Refill was approved on 06/19 by Dr Dayday Gill, but [facility] never got the Rx, refill was called in and authorized for a 90 day supply.

## 2019-10-14 ENCOUNTER — TELEPHONE (OUTPATIENT)
Dept: FAMILY MEDICINE CLINIC | Facility: CLINIC | Age: 49
End: 2019-10-14

## 2019-10-14 DIAGNOSIS — E03.9 HYPOTHYROIDISM, UNSPECIFIED TYPE: ICD-10-CM

## 2019-10-14 RX ORDER — LEVOTHYROXINE SODIUM 0.07 MG/1
75 TABLET ORAL
Qty: 30 TABLET | Refills: 0 | Status: SHIPPED | OUTPATIENT
Start: 2019-10-14 | End: 2019-11-01

## 2019-10-14 NOTE — TELEPHONE ENCOUNTER
A refill for a 30 day supply was authorized, patient needs to get labs done and to be seen after to get further refills, patient informed and will call back to schedule her appointment. Order mail to patient for the lab.

## 2019-10-22 ENCOUNTER — TELEPHONE (OUTPATIENT)
Dept: FAMILY MEDICINE CLINIC | Facility: CLINIC | Age: 49
End: 2019-10-22

## 2019-10-22 DIAGNOSIS — E78.00 HYPERCHOLESTEREMIA: Primary | ICD-10-CM

## 2019-10-22 NOTE — TELEPHONE ENCOUNTER
Patient wants to get labs done to recheck her lipid panel, orders were authorized and she will  orders tomorrow to go to [facility].

## 2019-10-22 NOTE — TELEPHONE ENCOUNTER
Pt called requesting a lab order to check her cholesterol, she will be coming to the hospital lab tomorrow for other labs and she wants this also to be checked tomorrow.

## 2019-11-01 ENCOUNTER — TELEPHONE (OUTPATIENT)
Dept: FAMILY MEDICINE CLINIC | Facility: CLINIC | Age: 49
End: 2019-11-01

## 2019-11-01 DIAGNOSIS — E78.00 HYPERCHOLESTEREMIA: Primary | ICD-10-CM

## 2019-11-01 DIAGNOSIS — E03.9 HYPOTHYROIDISM, UNSPECIFIED TYPE: ICD-10-CM

## 2019-11-01 RX ORDER — LEVOTHYROXINE SODIUM 0.05 MG/1
50 TABLET ORAL
Qty: 60 TABLET | Refills: 0 | Status: SHIPPED | OUTPATIENT
Start: 2019-11-01 | End: 2019-12-26

## 2019-11-01 RX ORDER — SIMVASTATIN 20 MG
20 TABLET ORAL NIGHTLY
Qty: 90 TABLET | Refills: 0 | Status: SHIPPED | OUTPATIENT
Start: 2019-11-01 | End: 2019-12-26

## 2019-11-01 NOTE — TELEPHONE ENCOUNTER
Per Dr Mg Mcgarry, dosage on her thyroid med needs to be adjusted, a new Rx was already sent and she is to recheck labs in 6 weeks to make sure new dosage is working properly, also a Rx for Simvastaitn was sent and she is to restart meds due to high cholesterol

## 2019-11-01 NOTE — TELEPHONE ENCOUNTER
Pt called requesting her blood test results and she also aske about the fecal test she had a while ago?  Please call back and advise

## 2019-12-26 ENCOUNTER — OFFICE VISIT (OUTPATIENT)
Dept: FAMILY MEDICINE CLINIC | Facility: CLINIC | Age: 49
End: 2019-12-26
Payer: COMMERCIAL

## 2019-12-26 VITALS
BODY MASS INDEX: 26.4 KG/M2 | SYSTOLIC BLOOD PRESSURE: 100 MMHG | WEIGHT: 149 LBS | HEIGHT: 63 IN | OXYGEN SATURATION: 98 % | DIASTOLIC BLOOD PRESSURE: 60 MMHG | HEART RATE: 69 BPM

## 2019-12-26 DIAGNOSIS — M54.42 CHRONIC LEFT-SIDED LOW BACK PAIN WITH LEFT-SIDED SCIATICA: ICD-10-CM

## 2019-12-26 DIAGNOSIS — G89.29 CHRONIC LEFT-SIDED LOW BACK PAIN WITH LEFT-SIDED SCIATICA: ICD-10-CM

## 2019-12-26 DIAGNOSIS — E78.00 HYPERCHOLESTEREMIA: ICD-10-CM

## 2019-12-26 DIAGNOSIS — G89.29 CHRONIC RIGHT SHOULDER PAIN: ICD-10-CM

## 2019-12-26 DIAGNOSIS — F51.04 PSYCHOPHYSIOLOGICAL INSOMNIA: ICD-10-CM

## 2019-12-26 DIAGNOSIS — M25.511 CHRONIC RIGHT SHOULDER PAIN: ICD-10-CM

## 2019-12-26 DIAGNOSIS — E03.9 HYPOTHYROIDISM, UNSPECIFIED TYPE: Primary | ICD-10-CM

## 2019-12-26 DIAGNOSIS — K13.79 LESION OF HARD PALATE: ICD-10-CM

## 2019-12-26 LAB
T4,FREE(DIRECT): 1.06 NG/DL
TSH: 5 UIU/ML

## 2019-12-26 PROCEDURE — 99214 OFFICE O/P EST MOD 30 MIN: CPT | Performed by: FAMILY MEDICINE

## 2019-12-26 RX ORDER — LEVOTHYROXINE SODIUM 0.12 MG/1
62.5 TABLET ORAL
Qty: 30 TABLET | Refills: 0 | Status: SHIPPED | OUTPATIENT
Start: 2019-12-26 | End: 2020-02-24

## 2019-12-26 RX ORDER — NAPROXEN 500 MG/1
500 TABLET ORAL 2 TIMES DAILY WITH MEALS
Qty: 60 TABLET | Refills: 2 | Status: SHIPPED | OUTPATIENT
Start: 2019-12-26 | End: 2021-02-03

## 2019-12-26 RX ORDER — SIMVASTATIN 20 MG
20 TABLET ORAL NIGHTLY
Qty: 90 TABLET | Refills: 0 | Status: SHIPPED | OUTPATIENT
Start: 2019-12-26 | End: 2020-04-24

## 2019-12-26 RX ORDER — ZOLPIDEM TARTRATE 5 MG/1
5 TABLET ORAL NIGHTLY PRN
Qty: 30 TABLET | Refills: 0 | Status: SHIPPED | OUTPATIENT
Start: 2019-12-26 | End: 2020-01-22

## 2019-12-26 RX ORDER — DEXAMETHASONE 0.5 MG/5ML
ELIXIR ORAL
Qty: 1 BOTTLE | Refills: 0 | Status: SHIPPED | OUTPATIENT
Start: 2019-12-26 | End: 2020-07-28

## 2019-12-26 NOTE — PROGRESS NOTES
HPI:   Patient presents with:  Lab Results: lab result follow up and medication refills      Urban Juan is a 47year old female presenting for:    Thyroid-> stable  Back/Shoulder pain-> stable w/ meds PRN; wants refill  Insomnia-> has tried OTC produ • Dexamethasone 0.5 MG/5ML Oral Elixir Take 5 mL; swish for 5 minutes and then spit three times daily until affected area resolves.  1 Bottle 0      Past Medical History:   Diagnosis Date   • Hypothyroidism          Past Surgical History:   Procedure Maryln Nyhan 04/12/19 : 146 lb (66.2 kg)      Physical Exam   Vitals reviewed. Constitutional: She appears well-developed. No distress.    HENT:   Mouth/Throat: Oropharynx is clear and moist.       Healing ulcers in hard palate   Eyes: Pupils are equal, round, and leeann Reasurrance and education provided. All questions answered. Notified to call with any questions, complications, allergies, or worsening or changing symptoms as well as any side effects or complications from the treatments .   Red flags/ ER precautions disc

## 2019-12-27 ENCOUNTER — TELEPHONE (OUTPATIENT)
Dept: FAMILY MEDICINE CLINIC | Facility: CLINIC | Age: 49
End: 2019-12-27

## 2019-12-27 NOTE — TELEPHONE ENCOUNTER
Pharmacy is calling to confirm quantity for the following medication:    Dexamethasone 0.5 MG/5ML Oral Elixir

## 2019-12-27 NOTE — TELEPHONE ENCOUNTER
Pharmacy wanted to know how many ounces they were supposed to provide the patient with, ok per Dr Germain Darden to dispense 6 oz and pharmacy notified.

## 2020-01-21 NOTE — TELEPHONE ENCOUNTER
Zolpidem Tartrate 5 MG Oral Tab     Patient will call back to schedule an appointment [facility].   Last visit 12/26/2019

## 2020-01-21 NOTE — TELEPHONE ENCOUNTER
Refill will be authorized starting 01/26/2020 for qty of 30, last Rx was given to her on 12/26/2019 for qty of 30 and she is to take it PRN. Message was left to inform patient.

## 2020-01-22 RX ORDER — ZOLPIDEM TARTRATE 5 MG/1
5 TABLET ORAL NIGHTLY PRN
Qty: 30 TABLET | Refills: 0 | Status: SHIPPED | OUTPATIENT
Start: 2020-01-26 | End: 2020-02-18

## 2020-01-24 ENCOUNTER — TELEPHONE (OUTPATIENT)
Dept: FAMILY MEDICINE CLINIC | Facility: CLINIC | Age: 50
End: 2020-01-24

## 2020-01-24 DIAGNOSIS — K13.79 LESION OF HARD PALATE: Primary | ICD-10-CM

## 2020-01-24 NOTE — TELEPHONE ENCOUNTER
Pt called stating that the medication Dexamethasone 0.5 MG/5ML Oral Elixir it has not helped at all and wants to be sent with a specialist just like Dr. Eloisa Salgado told her.    Please call and advise

## 2020-01-30 ENCOUNTER — OFFICE VISIT (OUTPATIENT)
Dept: OTOLARYNGOLOGY | Facility: CLINIC | Age: 50
End: 2020-01-30
Payer: COMMERCIAL

## 2020-01-30 VITALS
BODY MASS INDEX: 25.69 KG/M2 | TEMPERATURE: 97 F | HEIGHT: 63 IN | DIASTOLIC BLOOD PRESSURE: 70 MMHG | WEIGHT: 145 LBS | SYSTOLIC BLOOD PRESSURE: 109 MMHG

## 2020-01-30 DIAGNOSIS — K06.8 PAIN IN GUMS: Primary | ICD-10-CM

## 2020-01-30 DIAGNOSIS — K14.6 TONGUE PAIN: ICD-10-CM

## 2020-01-30 PROCEDURE — 99243 OFF/OP CNSLTJ NEW/EST LOW 30: CPT | Performed by: OTOLARYNGOLOGY

## 2020-01-30 RX ORDER — DIPHENHYDRAMINE HCL 12.5MG/5ML
LIQUID (ML) ORAL
Qty: 500 ML | Refills: 0 | Status: SHIPPED | OUTPATIENT
Start: 2020-01-30 | End: 2020-07-28

## 2020-01-30 NOTE — PROGRESS NOTES
Malinda Latham is a 47year old female.   Patient presents with:  Mouth/Lip Problem: pt presents with: lump on roof of mouth for 1 year       HISTORY OF PRESENT ILLNESS  She presents with a one-year history of sensing a small bump on the back of her gingiva changes. Respiratory Negative Dyspnea and wheezing. Cardio Negative Chest pain, irregular heartbeat/palpitations and syncope. GI Negative Abdominal pain and diarrhea. Endocrine Negative Cold intolerance and heat intolerance.    Neuro Negative Tremor Current Outpatient Medications:   •  Zolpidem Tartrate 5 MG Oral Tab, Take 1 tablet (5 mg total) by mouth nightly as needed for Sleep., Disp: 30 tablet, Rfl: 0  •  Levothyroxine Sodium 125 MCG Oral Tab, Take 0.5 tablets (62.5 mcg total) by mouth befo

## 2020-01-31 ENCOUNTER — TELEPHONE (OUTPATIENT)
Dept: OTOLARYNGOLOGY | Facility: CLINIC | Age: 50
End: 2020-01-31

## 2020-02-03 NOTE — TELEPHONE ENCOUNTER
Pt f/u on msg below. Asking for new rx to be sent to [facility] in [address] [place] [address] [address]. Please advise thank you.

## 2020-02-03 NOTE — TELEPHONE ENCOUNTER
Informed patient that Rx were  Called in to [facility] in [facility] [address] #[phone],[id number] pt to check if medication is ready,pt verbalized understanding.

## 2020-02-18 ENCOUNTER — TELEPHONE (OUTPATIENT)
Dept: FAMILY MEDICINE CLINIC | Facility: CLINIC | Age: 50
End: 2020-02-18

## 2020-02-18 RX ORDER — ZOLPIDEM TARTRATE 5 MG/1
5 TABLET ORAL NIGHTLY PRN
Qty: 30 TABLET | Refills: 0 | Status: SHIPPED | OUTPATIENT
Start: 2020-02-24 | End: 2020-03-24

## 2020-02-18 NOTE — TELEPHONE ENCOUNTER
Patient states she has enough for a week she just wanted to call with enough time is aware script can not be filled until a week from now

## 2020-02-24 ENCOUNTER — TELEPHONE (OUTPATIENT)
Dept: FAMILY MEDICINE CLINIC | Facility: CLINIC | Age: 50
End: 2020-02-24

## 2020-02-24 DIAGNOSIS — E03.9 HYPOTHYROIDISM, UNSPECIFIED TYPE: ICD-10-CM

## 2020-02-24 RX ORDER — LEVOTHYROXINE SODIUM 0.12 MG/1
62.5 TABLET ORAL
Qty: 30 TABLET | Refills: 1 | Status: SHIPPED | OUTPATIENT
Start: 2020-02-24 | End: 2020-04-24

## 2020-02-24 NOTE — TELEPHONE ENCOUNTER
PT called requesting refills for Levothyroxine Sodium 125 MCG Oral Tab. She stated that she only has 3 more pills.

## 2020-03-03 ENCOUNTER — OFFICE VISIT (OUTPATIENT)
Dept: OTOLARYNGOLOGY | Facility: CLINIC | Age: 50
End: 2020-03-03
Payer: COMMERCIAL

## 2020-03-03 VITALS
BODY MASS INDEX: 25.69 KG/M2 | WEIGHT: 145 LBS | TEMPERATURE: 98 F | SYSTOLIC BLOOD PRESSURE: 104 MMHG | DIASTOLIC BLOOD PRESSURE: 60 MMHG | HEIGHT: 63 IN

## 2020-03-03 DIAGNOSIS — M27.2 HARD PALATE ABSCESS: Primary | ICD-10-CM

## 2020-03-03 PROCEDURE — 99213 OFFICE O/P EST LOW 20 MIN: CPT | Performed by: OTOLARYNGOLOGY

## 2020-03-03 NOTE — PROGRESS NOTES
Larry Marrufo is a 47year old female.   Patient presents with:  Mouth/Lip Problem: pt here for a 1 month follow up on Tongue pain, per pt oral elixir did no help, still having pain on roof of mouth       HISTORY OF PRESENT ILLNESS  She presents with a one Past Surgical History:   Procedure Laterality Date   • TUBAL LIGATION     • US FNA THYROID, GUIDE INCLD, FIRST LESION (CPT=10005)  05/15/2015    benign thyroid nodule         REVIEW OF SYSTEMS    System Neg/Pos Details   Constitutional Negative Fatig Supraclavicular.    Palate   small area of fullness and ballotable right behind the front teeth of the maxilla   Nose/Mouth/Throat Normal External nose - Normal. Lips/teeth/gums - Normal. Tonsils - Normal. Oropharynx - Normal.   Nose/Mouth/Throat Normal Олег

## 2020-03-05 ENCOUNTER — TELEPHONE (OUTPATIENT)
Dept: OTOLARYNGOLOGY | Facility: CLINIC | Age: 50
End: 2020-03-05

## 2020-03-14 ENCOUNTER — HOSPITAL ENCOUNTER (OUTPATIENT)
Dept: CT IMAGING | Facility: HOSPITAL | Age: 50
Discharge: HOME | End: 2020-03-14
Attending: OTOLARYNGOLOGY
Payer: COMMERCIAL

## 2020-03-14 DIAGNOSIS — M27.2 HARD PALATE ABSCESS: ICD-10-CM

## 2020-03-14 LAB — CREAT BLD-MCNC: 0.6 MG/DL (ref 0.55–1.02)

## 2020-03-14 PROCEDURE — 70488 CT MAXILLOFACIAL W/O & W/DYE: CPT | Performed by: OTOLARYNGOLOGY

## 2020-03-14 PROCEDURE — 82565 ASSAY OF CREATININE: CPT

## 2020-03-19 ENCOUNTER — TELEPHONE (OUTPATIENT)
Dept: OTOLARYNGOLOGY | Facility: CLINIC | Age: 50
End: 2020-03-19

## 2020-03-24 RX ORDER — ZOLPIDEM TARTRATE 5 MG/1
5 TABLET ORAL NIGHTLY PRN
Qty: 30 TABLET | Refills: 0 | Status: SHIPPED | OUTPATIENT
Start: 2020-03-24 | End: 2020-04-24

## 2020-04-16 PROCEDURE — 99442 PHONE E/M BY PHYS 11-20 MIN: CPT | Performed by: FAMILY MEDICINE

## 2020-04-24 ENCOUNTER — VIRTUAL PHONE E/M (OUTPATIENT)
Dept: FAMILY MEDICINE CLINIC | Facility: CLINIC | Age: 50
End: 2020-04-24
Payer: COMMERCIAL

## 2020-04-24 DIAGNOSIS — Z12.39 BREAST CANCER SCREENING: Primary | ICD-10-CM

## 2020-04-24 DIAGNOSIS — E03.9 HYPOTHYROIDISM, UNSPECIFIED TYPE: ICD-10-CM

## 2020-04-24 DIAGNOSIS — F51.04 PSYCHOPHYSIOLOGICAL INSOMNIA: ICD-10-CM

## 2020-04-24 DIAGNOSIS — E78.00 HYPERCHOLESTEREMIA: ICD-10-CM

## 2020-04-24 RX ORDER — LEVOTHYROXINE SODIUM 0.12 MG/1
62.5 TABLET ORAL
Qty: 30 TABLET | Refills: 1 | Status: SHIPPED | OUTPATIENT
Start: 2020-04-24 | End: 2020-07-29

## 2020-04-24 RX ORDER — SIMVASTATIN 20 MG
20 TABLET ORAL NIGHTLY
Qty: 90 TABLET | Refills: 0 | Status: SHIPPED | OUTPATIENT
Start: 2020-04-24 | End: 2020-07-29

## 2020-04-24 RX ORDER — ZOLPIDEM TARTRATE 5 MG/1
5 TABLET ORAL NIGHTLY PRN
Qty: 30 TABLET | Refills: 2 | Status: SHIPPED | OUTPATIENT
Start: 2020-04-24 | End: 2020-07-23

## 2020-04-24 NOTE — PROGRESS NOTES
Virtual Telephone Check-In    Yue Jimenez verbally consents to a Virtual/Telephone Check-In visit on 04/16/20. Patient understands and accepts financial responsibility for any deductible, co-insurance and/or co-pays associated with this service.     D communication. This has been done in good sukh to provide continuity of care in the best interest of the provider-patient relationship, due to the ongoing public health crisis/national emergency and because of restrictions of visitation.   There are limit

## 2020-04-27 ENCOUNTER — TELEPHONE (OUTPATIENT)
Dept: FAMILY MEDICINE CLINIC | Facility: CLINIC | Age: 50
End: 2020-04-27

## 2020-04-27 NOTE — TELEPHONE ENCOUNTER
Pt called asking if Dr Jairo Gonzales faxed over the lab orders for her BW to [facility]? She complained of going twice to try to get her labs done and there where no orders. Please fax orders to [phone]  Or Email to [email or web address]

## 2020-05-06 ENCOUNTER — TELEPHONE (OUTPATIENT)
Dept: FAMILY MEDICINE CLINIC | Facility: CLINIC | Age: 50
End: 2020-05-06

## 2020-05-06 NOTE — TELEPHONE ENCOUNTER
Pt called asking if we received her BW results she got done last week at [facility]?  Please call and advise.

## 2020-05-08 NOTE — TELEPHONE ENCOUNTER
Blood work was done 04/28/2020, we were able to locate them but because we don't have an account with them they needed to verify our info first.  We are just waiting to receive her results.

## 2020-05-11 NOTE — TELEPHONE ENCOUNTER
Per Dr Tab Alicia, results are stable and wants patient to continue same medication dosages. Results and recommendations were discussed with patient, no need to send Rx's at the moment, all questions were answered and she verbalized understanding.

## 2020-05-21 ENCOUNTER — TELEPHONE (OUTPATIENT)
Dept: OTOLARYNGOLOGY | Facility: CLINIC | Age: 50
End: 2020-05-21

## 2020-05-21 DIAGNOSIS — K06.8 PAIN IN GUMS: Primary | ICD-10-CM

## 2020-05-21 PROCEDURE — 99442 PHONE E/M BY PHYS 11-20 MIN: CPT | Performed by: OTOLARYNGOLOGY

## 2020-05-21 NOTE — TELEPHONE ENCOUNTER
Virtual Telephone Check-In    June Herndon verbally consents to a Virtual/Telephone Check-In visit on 05/21/20. Patient has been referred to the [facility] website at [email or web address] to review the yearly Consent to Treat document.     Patient Kurtcliff Kameron of the sinus. Currently without any sinus symptoms nasal drainage nasal odor nasal pain or headache. Completely asymptomatic.   No other signs, symptoms or complaints      Social History    Socioeconomic History      Marital status:       Spouse na easy bruising. Physical exam limited to patient's description of what she sees. She states that she does not feel anything abnormal at this very moment.         Current Outpatient Medications:   •  simvastatin 20 MG Oral Tab, Take 1 tablet (20 mg total this telephone encounter and agrees with this plan and will return to see me at her convenience. 15 minutes spent with patient. Jose Snow. Axel Adams MD    5/21/2020                Jose Snow.  Axel Adams MD

## 2020-05-22 ENCOUNTER — TELEPHONE (OUTPATIENT)
Dept: FAMILY MEDICINE CLINIC | Facility: CLINIC | Age: 50
End: 2020-05-22

## 2020-05-22 NOTE — TELEPHONE ENCOUNTER
Zolpidem RX given on 04/24/2020 with 2 refills.    I called patient and informed her she has refills and should contact her pharmacy for refill

## 2020-05-22 NOTE — TELEPHONE ENCOUNTER
Pt called requesting refills for zolpidem 5 MG Oral Tab  She only has 3 pills left.   Pt wants to be informed when this has been sent

## 2020-06-11 ENCOUNTER — TELEPHONE (OUTPATIENT)
Dept: OTOLARYNGOLOGY | Facility: CLINIC | Age: 50
End: 2020-06-11

## 2020-06-12 NOTE — TELEPHONE ENCOUNTER
Received refill request for Dexamethasone 0.5 MG/5ML- take 5 ml- swish for 5 minutes and then spit 3 times daily until affected area resolves.     LOV 5-21-20 was an  [employer]

## 2020-06-19 ENCOUNTER — OFFICE VISIT (OUTPATIENT)
Dept: OTOLARYNGOLOGY | Facility: CLINIC | Age: 50
End: 2020-06-19
Payer: COMMERCIAL

## 2020-06-19 VITALS
BODY MASS INDEX: 25.69 KG/M2 | HEIGHT: 63 IN | SYSTOLIC BLOOD PRESSURE: 102 MMHG | WEIGHT: 145 LBS | TEMPERATURE: 98 F | DIASTOLIC BLOOD PRESSURE: 74 MMHG

## 2020-06-19 DIAGNOSIS — K06.8 PAIN IN GUMS: Primary | ICD-10-CM

## 2020-06-19 PROCEDURE — 99213 OFFICE O/P EST LOW 20 MIN: CPT | Performed by: OTOLARYNGOLOGY

## 2020-06-19 NOTE — PROGRESS NOTES
Kendra Snell is a 45year old female.   Patient presents with:  Mouth/Lip Problem: pt here for a follow up with bump on palate       HISTORY OF PRESENT ILLNESS  She presents with a one-year history of sensing a small bump on the back of her gingiva behind History   Problem Relation Age of Onset   • Stroke Father    • Anemia Father    • Thyroid disease Father    • Cancer Mother         oral CA   • Hypertension Mother    • Gastro-Intestinal Disorder Mother         GERD   • No Known Problems Daughter    • No K Fundus - Right: Normal, Left: Normal.   Neurological Normal Memory - Normal. Cranial nerves - Cranial nerves II through XII grossly intact.    Head/Face Normal Facial features - Normal. Eyebrows - Normal. Skull - Normal.        Nasopharynx Normal External n at this time that she follow-up with endodontist or possibly an oral surgeon to get a second opinion regarding her chronic gum pain. At this time and prior to this are much I can offer her    Return to see me as needed. She agrees with this plan.

## 2020-07-22 ENCOUNTER — TELEPHONE (OUTPATIENT)
Dept: FAMILY MEDICINE CLINIC | Facility: CLINIC | Age: 50
End: 2020-07-22

## 2020-07-22 DIAGNOSIS — F51.04 PSYCHOPHYSIOLOGICAL INSOMNIA: ICD-10-CM

## 2020-07-22 NOTE — TELEPHONE ENCOUNTER
Patient called requesting lab orders to be faxed. Faxed to [phone] failed 3 times.      [facility] to [phone] (success)

## 2020-07-22 NOTE — TELEPHONE ENCOUNTER
Patient is calling requesting zolpidem 5 MG Oral Tab. She only have 2 pills left.  If she can get a partial refill has an appointment next week

## 2020-07-24 RX ORDER — ZOLPIDEM TARTRATE 5 MG/1
5 TABLET ORAL NIGHTLY PRN
Qty: 4 TABLET | Refills: 0 | Status: SHIPPED | OUTPATIENT
Start: 2020-07-24 | End: 2020-07-29

## 2020-07-27 ENCOUNTER — HOSPITAL ENCOUNTER (OUTPATIENT)
Dept: MAMMOGRAPHY | Facility: HOSPITAL | Age: 50
Discharge: HOME | End: 2020-07-27
Attending: FAMILY MEDICINE
Payer: COMMERCIAL

## 2020-07-27 DIAGNOSIS — Z12.39 BREAST CANCER SCREENING: ICD-10-CM

## 2020-07-27 PROCEDURE — 77063 BREAST TOMOSYNTHESIS BI: CPT | Performed by: FAMILY MEDICINE

## 2020-07-27 PROCEDURE — 77067 SCR MAMMO BI INCL CAD: CPT | Performed by: FAMILY MEDICINE

## 2020-07-28 ENCOUNTER — OFFICE VISIT (OUTPATIENT)
Dept: FAMILY MEDICINE CLINIC | Facility: CLINIC | Age: 50
End: 2020-07-28
Payer: COMMERCIAL

## 2020-07-28 VITALS
SYSTOLIC BLOOD PRESSURE: 114 MMHG | BODY MASS INDEX: 26.4 KG/M2 | OXYGEN SATURATION: 94 % | HEART RATE: 72 BPM | HEIGHT: 63 IN | DIASTOLIC BLOOD PRESSURE: 80 MMHG | WEIGHT: 149 LBS

## 2020-07-28 DIAGNOSIS — Z00.00 WELLNESS EXAMINATION: ICD-10-CM

## 2020-07-28 DIAGNOSIS — G89.29 CHRONIC DENTAL PAIN: Primary | ICD-10-CM

## 2020-07-28 DIAGNOSIS — F51.04 PSYCHOPHYSIOLOGICAL INSOMNIA: ICD-10-CM

## 2020-07-28 DIAGNOSIS — E03.9 HYPOTHYROIDISM, UNSPECIFIED TYPE: ICD-10-CM

## 2020-07-28 DIAGNOSIS — M75.21 BICEPS TENDINITIS OF BOTH SHOULDERS: ICD-10-CM

## 2020-07-28 DIAGNOSIS — M75.22 BICEPS TENDINITIS OF BOTH SHOULDERS: ICD-10-CM

## 2020-07-28 DIAGNOSIS — Z12.11 COLON CANCER SCREENING: ICD-10-CM

## 2020-07-28 DIAGNOSIS — E78.00 HYPERCHOLESTEREMIA: ICD-10-CM

## 2020-07-28 DIAGNOSIS — K08.9 CHRONIC DENTAL PAIN: Primary | ICD-10-CM

## 2020-07-28 PROCEDURE — 99213 OFFICE O/P EST LOW 20 MIN: CPT | Performed by: FAMILY MEDICINE

## 2020-07-28 PROCEDURE — 3074F SYST BP LT 130 MM HG: CPT | Performed by: FAMILY MEDICINE

## 2020-07-28 PROCEDURE — 3079F DIAST BP 80-89 MM HG: CPT | Performed by: FAMILY MEDICINE

## 2020-07-28 PROCEDURE — 3008F BODY MASS INDEX DOCD: CPT | Performed by: FAMILY MEDICINE

## 2020-07-28 PROCEDURE — 99396 PREV VISIT EST AGE 40-64: CPT | Performed by: FAMILY MEDICINE

## 2020-07-28 RX ORDER — HYDROCORTISONE 20 MG/1
TABLET ORAL
COMMUNITY
Start: 2020-02-03 | End: 2020-10-07 | Stop reason: ALTCHOICE

## 2020-07-29 ENCOUNTER — TELEPHONE (OUTPATIENT)
Dept: FAMILY MEDICINE CLINIC | Facility: CLINIC | Age: 50
End: 2020-07-29

## 2020-07-29 RX ORDER — PREDNISONE 20 MG/1
20 TABLET ORAL DAILY
Qty: 5 TABLET | Refills: 0 | Status: SHIPPED | OUTPATIENT
Start: 2020-07-29 | End: 2020-08-03

## 2020-07-29 RX ORDER — ZOLPIDEM TARTRATE 5 MG/1
5 TABLET ORAL NIGHTLY PRN
Qty: 90 TABLET | Refills: 1 | Status: SHIPPED | OUTPATIENT
Start: 2020-07-29 | End: 2021-02-03

## 2020-07-29 RX ORDER — SIMVASTATIN 20 MG
20 TABLET ORAL NIGHTLY
Qty: 90 TABLET | Refills: 1 | Status: SHIPPED | OUTPATIENT
Start: 2020-07-29 | End: 2021-02-03

## 2020-07-29 RX ORDER — LEVOTHYROXINE SODIUM 0.12 MG/1
62.5 TABLET ORAL
Qty: 90 TABLET | Refills: 1 | Status: SHIPPED | OUTPATIENT
Start: 2020-07-29 | End: 2021-02-09

## 2020-07-29 NOTE — TELEPHONE ENCOUNTER
Called patient,  verified, results were normal per Dr Nasim Gonzalez and refills were sent for 6months.

## 2020-07-29 NOTE — TELEPHONE ENCOUNTER
Patient is calling stating she was seen yesterday but no medications where called in. She states she is out and needs them for today. Patient also states she went Friday 07/24 to get blood work done at [facility].  Looking to get blood work result

## 2020-07-29 NOTE — PROGRESS NOTES
CC: Annual Physical Exam    HPI:   Teressa Mcnulty is a 45year old female who presents for a complete physical exam.    HCM  -Diet:  Well-balanced.   -Exercise: regularly  -Mental Health: denies any depression or anxiety sx  -Skin care:  no concerning lesi Hypothyroidism       Past Surgical History:   Procedure Laterality Date   • TUBAL LIGATION     • US FNA THYROID, GUIDE INCLD, FIRST LESION (CPT=10005)  05/15/2015    benign thyroid nodule      Family History   Problem Relation Age of Onset   • Stroke [facility] IMMUNOASSAY; Future  -declines colonoscopy    Hypercholesteremia  -     simvastatin 20 MG Oral Tab; Take 1 tablet (20 mg total) by mouth nightly.  -controlled. -CPM    Psychophysiological insomnia  -     zolpidem 5 MG Oral Tab;  Take 1 tablet (5 mg total)

## 2020-07-30 ENCOUNTER — TELEPHONE (OUTPATIENT)
Dept: FAMILY MEDICINE CLINIC | Facility: CLINIC | Age: 50
End: 2020-07-30

## 2020-07-30 NOTE — TELEPHONE ENCOUNTER
----- Message from Annita Vidales MD sent at 7/29/2020  7:43 PM CDT -----  Please let her know that her mammogram is stable with benign appearing density.  Next in 1yr

## 2020-09-18 ENCOUNTER — TELEPHONE (OUTPATIENT)
Dept: FAMILY MEDICINE CLINIC | Facility: CLINIC | Age: 50
End: 2020-09-18

## 2020-09-28 NOTE — TELEPHONE ENCOUNTER
Results were discussed with patient, she is aware it was negative and that she needs to repeat it in a year.

## 2020-10-07 ENCOUNTER — OFFICE VISIT (OUTPATIENT)
Dept: FAMILY MEDICINE CLINIC | Facility: CLINIC | Age: 50
End: 2020-10-07
Payer: COMMERCIAL

## 2020-10-07 VITALS
HEART RATE: 65 BPM | OXYGEN SATURATION: 98 % | BODY MASS INDEX: 26.05 KG/M2 | HEIGHT: 63 IN | DIASTOLIC BLOOD PRESSURE: 60 MMHG | WEIGHT: 147 LBS | SYSTOLIC BLOOD PRESSURE: 110 MMHG

## 2020-10-07 DIAGNOSIS — K13.79 LESION OF HARD PALATE: Primary | ICD-10-CM

## 2020-10-07 DIAGNOSIS — Z23 NEED FOR INFLUENZA VACCINATION: ICD-10-CM

## 2020-10-07 PROCEDURE — 3074F SYST BP LT 130 MM HG: CPT | Performed by: FAMILY MEDICINE

## 2020-10-07 PROCEDURE — 90686 IIV4 VACC NO PRSV 0.5 ML IM: CPT | Performed by: FAMILY MEDICINE

## 2020-10-07 PROCEDURE — 99213 OFFICE O/P EST LOW 20 MIN: CPT | Performed by: FAMILY MEDICINE

## 2020-10-07 PROCEDURE — 90471 IMMUNIZATION ADMIN: CPT | Performed by: FAMILY MEDICINE

## 2020-10-07 PROCEDURE — 3078F DIAST BP <80 MM HG: CPT | Performed by: FAMILY MEDICINE

## 2020-10-07 PROCEDURE — 3008F BODY MASS INDEX DOCD: CPT | Performed by: FAMILY MEDICINE

## 2020-10-08 ENCOUNTER — TELEPHONE (OUTPATIENT)
Dept: FAMILY MEDICINE CLINIC | Facility: CLINIC | Age: 50
End: 2020-10-08

## 2020-10-08 DIAGNOSIS — K13.79 LESION OF HARD PALATE: Primary | ICD-10-CM

## 2020-10-08 NOTE — TELEPHONE ENCOUNTER
Pt called stating that the doctor she was referred to Dr Yanet Correa is not in network.   If you can please refer her with someone else  Please call back and advise

## 2020-10-09 NOTE — TELEPHONE ENCOUNTER
Patient notified about new referral, # to specialist was provided she is aware that if her insurance is not accepted she will have to contact her insurance to get a list of providers that do accept it. Referral was mailed as well.

## 2020-10-09 NOTE — TELEPHONE ENCOUNTER
PCP would like to refer patient to tertiary care for second opinion on lesion. Already has seen ENT and Oral Maxillofacial specialists. Looked through [employer] and found in-network, Dr. Madhuri Leyva at [facility].     Referral placed; auto-author

## 2020-10-12 NOTE — TELEPHONE ENCOUNTER
Pt called back stating that Dr Jaleel Ayala is not in her network and she was told that Dr. Ramona Barthel is one of the doctors that is in network with her insurance. Pt is requesting a referral for her.   Pt wants this referral to be faxed over to [phone]

## 2020-10-15 NOTE — TELEPHONE ENCOUNTER
Pt called back stating that Dr. Yaw Contreras is not working at the office that appears at the referral.  So now pt is requesting a referral for [facility], that way she will be able to see any ENT doctor there.   Pt asked to please fax this new referral t

## 2020-11-08 NOTE — PROGRESS NOTES
HPI:   No chief complaint on file.       Mami Orosco is a 45year old female presenting for:    Hard palate lesion with pain  -Saw ENT, dental and oral surgeon  -Imaging done and different oral washes have been tried which help some but not completley • TUBAL LIGATION     • US FNA THYROID, GUIDE INCLD, FIRST LESION (CPT=10005)  05/15/2015    benign thyroid nodule     No Known Allergies   Social History:  Social History    Tobacco Use      Smoking status: Never Smoker      Smokeless tobacco: Never Used No murmur heard. Edema not present. Pulmonary/Chest: Effort normal and breath sounds normal. No respiratory distress. Abdominal: Soft. Bowel sounds are normal. She exhibits no distension. There is no abdominal tenderness.    Neurological: No cranial ne Pap Smear,3 Years due on 03/14/2021  Mammogram due on 07/27/2021  Annual Depression Screen due on 07/28/2021  Annual Physical due on 07/28/2021  FIT Colorectal Screening due on 09/02/2021  Influenza Vaccine Completed  Pneumococcal Vaccine: Birth to 64yrs C

## 2020-11-19 ENCOUNTER — TELEPHONE (OUTPATIENT)
Dept: FAMILY MEDICINE CLINIC | Facility: CLINIC | Age: 50
End: 2020-11-19

## 2020-11-19 NOTE — TELEPHONE ENCOUNTER
Patient got a Rx on 07/29/2020 for qty of 90 with one refill, per patient she only got 30 last month instead of 90. Patient will call her pharmacy and get back to us in case she wants us to resend Rx.

## 2021-02-03 ENCOUNTER — OFFICE VISIT (OUTPATIENT)
Dept: FAMILY MEDICINE CLINIC | Facility: CLINIC | Age: 51
End: 2021-02-03
Payer: COMMERCIAL

## 2021-02-03 VITALS
HEIGHT: 63 IN | DIASTOLIC BLOOD PRESSURE: 74 MMHG | WEIGHT: 145 LBS | HEART RATE: 78 BPM | OXYGEN SATURATION: 98 % | SYSTOLIC BLOOD PRESSURE: 118 MMHG | BODY MASS INDEX: 25.69 KG/M2

## 2021-02-03 DIAGNOSIS — Z23 NEED FOR SHINGLES VACCINE: ICD-10-CM

## 2021-02-03 DIAGNOSIS — G89.29 CHRONIC RIGHT SHOULDER PAIN: ICD-10-CM

## 2021-02-03 DIAGNOSIS — E78.00 HYPERCHOLESTEREMIA: ICD-10-CM

## 2021-02-03 DIAGNOSIS — G89.29 CHRONIC LEFT-SIDED LOW BACK PAIN WITH LEFT-SIDED SCIATICA: Primary | ICD-10-CM

## 2021-02-03 DIAGNOSIS — E03.9 HYPOTHYROIDISM, UNSPECIFIED TYPE: ICD-10-CM

## 2021-02-03 DIAGNOSIS — M54.42 CHRONIC LEFT-SIDED LOW BACK PAIN WITH LEFT-SIDED SCIATICA: Primary | ICD-10-CM

## 2021-02-03 DIAGNOSIS — F51.04 PSYCHOPHYSIOLOGICAL INSOMNIA: ICD-10-CM

## 2021-02-03 DIAGNOSIS — M25.511 CHRONIC RIGHT SHOULDER PAIN: ICD-10-CM

## 2021-02-03 PROCEDURE — 3078F DIAST BP <80 MM HG: CPT | Performed by: FAMILY MEDICINE

## 2021-02-03 PROCEDURE — 90750 HZV VACC RECOMBINANT IM: CPT | Performed by: FAMILY MEDICINE

## 2021-02-03 PROCEDURE — 99214 OFFICE O/P EST MOD 30 MIN: CPT | Performed by: FAMILY MEDICINE

## 2021-02-03 PROCEDURE — 90471 IMMUNIZATION ADMIN: CPT | Performed by: FAMILY MEDICINE

## 2021-02-03 PROCEDURE — 3008F BODY MASS INDEX DOCD: CPT | Performed by: FAMILY MEDICINE

## 2021-02-03 PROCEDURE — 3074F SYST BP LT 130 MM HG: CPT | Performed by: FAMILY MEDICINE

## 2021-02-03 RX ORDER — SIMVASTATIN 20 MG
20 TABLET ORAL NIGHTLY
Qty: 90 TABLET | Refills: 1 | Status: SHIPPED | OUTPATIENT
Start: 2021-02-03 | End: 2021-03-08

## 2021-02-03 RX ORDER — ZOLPIDEM TARTRATE 5 MG/1
5 TABLET ORAL NIGHTLY PRN
Qty: 90 TABLET | Refills: 1 | Status: SHIPPED | OUTPATIENT
Start: 2021-02-03 | End: 2021-08-04

## 2021-02-03 RX ORDER — NAPROXEN 500 MG/1
500 TABLET ORAL 2 TIMES DAILY WITH MEALS
Qty: 60 TABLET | Refills: 2 | Status: SHIPPED | OUTPATIENT
Start: 2021-02-03

## 2021-02-08 ENCOUNTER — TELEPHONE (OUTPATIENT)
Dept: FAMILY MEDICINE CLINIC | Facility: CLINIC | Age: 51
End: 2021-02-08

## 2021-02-08 NOTE — TELEPHONE ENCOUNTER
PT called requesting her bw results that she had done on 02/03 at [facility]  Please call back and advise

## 2021-02-09 RX ORDER — LEVOTHYROXINE SODIUM 0.12 MG/1
62.5 TABLET ORAL
Qty: 90 TABLET | Refills: 1 | Status: SHIPPED | OUTPATIENT
Start: 2021-02-09

## 2021-02-15 NOTE — PROGRESS NOTES
HPI:   Patient presents with:  Medication Follow-Up      Orly Garrison is a 45year old female presenting for:    Thyroid-> stable; asymptomatic     Insomnia-> stable w/ ambien; needs refills     HLD-> complaint; asymptomatic     Left sided back pain wi TUBAL LIGATION     • US FNA THYROID, GUIDE INCLD, FIRST LESION (CPT=10005)  05/15/2015    benign thyroid nodule     No Known Allergies   Social History:  Social History    Tobacco Use      Smoking status: Never Smoker      Smokeless tobacco: Never Used Conjunctivae are normal.   Cardiovascular: Normal rate, regular rhythm and normal heart sounds. No murmur heard. Edema not present. Pulmonary/Chest: Effort normal and breath sounds normal. No respiratory distress. Abdominal: Soft.  Bowel sounds are appropriate medical history, evaluating patient, discussing treatment options, counseling and education (diet and exercise), ordering appropriate diagnostic tests and completing documentation.         Meds & Refills for this Visit:  Requested Prescriptions

## 2021-03-06 DIAGNOSIS — E78.00 HYPERCHOLESTEREMIA: ICD-10-CM

## 2021-03-08 RX ORDER — SIMVASTATIN 20 MG
20 TABLET ORAL NIGHTLY
Qty: 30 TABLET | Refills: 0 | Status: SHIPPED | OUTPATIENT
Start: 2021-03-08 | End: 2021-10-22

## 2021-08-04 ENCOUNTER — OFFICE VISIT (OUTPATIENT)
Dept: FAMILY MEDICINE CLINIC | Facility: CLINIC | Age: 51
End: 2021-08-04
Payer: COMMERCIAL

## 2021-08-04 VITALS
OXYGEN SATURATION: 99 % | TEMPERATURE: 98 F | HEART RATE: 70 BPM | HEIGHT: 63 IN | WEIGHT: 148 LBS | SYSTOLIC BLOOD PRESSURE: 110 MMHG | BODY MASS INDEX: 26.22 KG/M2 | DIASTOLIC BLOOD PRESSURE: 70 MMHG

## 2021-08-04 DIAGNOSIS — E78.00 HYPERCHOLESTEREMIA: ICD-10-CM

## 2021-08-04 DIAGNOSIS — Z23 NEED FOR VACCINATION: ICD-10-CM

## 2021-08-04 DIAGNOSIS — K08.9 CHRONIC DENTAL PAIN: ICD-10-CM

## 2021-08-04 DIAGNOSIS — E03.9 HYPOTHYROIDISM, UNSPECIFIED TYPE: ICD-10-CM

## 2021-08-04 DIAGNOSIS — Z12.4 PAP SMEAR FOR CERVICAL CANCER SCREENING: ICD-10-CM

## 2021-08-04 DIAGNOSIS — Z12.31 ENCOUNTER FOR SCREENING MAMMOGRAM FOR MALIGNANT NEOPLASM OF BREAST: ICD-10-CM

## 2021-08-04 DIAGNOSIS — Z00.00 WELLNESS EXAMINATION: Primary | ICD-10-CM

## 2021-08-04 DIAGNOSIS — F51.04 PSYCHOPHYSIOLOGICAL INSOMNIA: ICD-10-CM

## 2021-08-04 DIAGNOSIS — G89.29 CHRONIC DENTAL PAIN: ICD-10-CM

## 2021-08-04 PROCEDURE — 3074F SYST BP LT 130 MM HG: CPT | Performed by: FAMILY MEDICINE

## 2021-08-04 PROCEDURE — 87624 HPV HI-RISK TYP POOLED RSLT: CPT | Performed by: FAMILY MEDICINE

## 2021-08-04 PROCEDURE — 99212 OFFICE O/P EST SF 10 MIN: CPT | Performed by: FAMILY MEDICINE

## 2021-08-04 PROCEDURE — 99396 PREV VISIT EST AGE 40-64: CPT | Performed by: FAMILY MEDICINE

## 2021-08-04 PROCEDURE — 3078F DIAST BP <80 MM HG: CPT | Performed by: FAMILY MEDICINE

## 2021-08-04 PROCEDURE — 90471 IMMUNIZATION ADMIN: CPT | Performed by: FAMILY MEDICINE

## 2021-08-04 PROCEDURE — 3008F BODY MASS INDEX DOCD: CPT | Performed by: FAMILY MEDICINE

## 2021-08-04 PROCEDURE — 90750 HZV VACC RECOMBINANT IM: CPT | Performed by: FAMILY MEDICINE

## 2021-08-04 RX ORDER — ZOLPIDEM TARTRATE 5 MG/1
5 TABLET ORAL NIGHTLY PRN
Qty: 90 TABLET | Refills: 1 | Status: SHIPPED | OUTPATIENT
Start: 2021-08-04

## 2021-08-05 LAB — HPV I/H RISK 1 DNA SPEC QL NAA+PROBE: NEGATIVE

## 2021-08-05 NOTE — PROGRESS NOTES
CC: Annual Physical Exam    HPI:   Aiden Camejo is a 45year old female who presents for a complete physical exam.    HCM  -Diet:  Well-balanced.   -Exercise active  -Mental Health: denies any depression or anxiety sx  -Skin care:  no concerning lesions/ Sister    • No Known Problems Brother    • No Known Problems Daughter    • No Known Problems Sister    • No Known Problems Sister    • No Known Problems Brother    • No Known Problems Brother    • Cancer Paternal Aunt         GI CA      Social History:   S General: Bowel sounds are normal. There is no distension. Palpations: Abdomen is soft. Tenderness: There is no abdominal tenderness. Musculoskeletal:         General: Normal range of motion.       Cervical back: Normal range of motion and neck 07/28/2021  Annual Physical due on 07/28/2021  FIT/FOBT Colorectal Screening due on 09/02/2021      The patient indicates understanding of these issues and agrees to the plan. Return in about 6 months (around 2/4/2022) for follow-up.   Reasurrance and educ

## 2021-08-12 ENCOUNTER — TELEPHONE (OUTPATIENT)
Dept: FAMILY MEDICINE CLINIC | Facility: CLINIC | Age: 51
End: 2021-08-12

## 2021-08-17 NOTE — TELEPHONE ENCOUNTER
Rafal Shrestha,     Morales papanicolao esta normal. El proximo le toca en 5 yrs     Dr. Suzanne Tena   Written by Sejal Merrill MD on 8/16/2021  8:44 AM CDT  Not seen

## 2021-08-30 ENCOUNTER — TELEPHONE (OUTPATIENT)
Dept: FAMILY MEDICINE CLINIC | Facility: CLINIC | Age: 51
End: 2021-08-30

## 2021-08-30 NOTE — TELEPHONE ENCOUNTER
Pt called stating that she called [facility] to request for her lab work to be faxed over and she was told that the request has to come from [facility]  Pt wants to know her bw results to please request them

## 2021-08-30 NOTE — TELEPHONE ENCOUNTER
Results were received and patient was notified and is aware MD is out of the office but we will be calling her tomorrow once we get results reviewed by Dr Florence Chavez.

## 2021-08-31 NOTE — TELEPHONE ENCOUNTER
Results were reviewed by Dr Starla Neville, he wants patient to continue everything the same. Patient was notified of her results and no need for refills are needed at the moment.   Per Dr Starla Neville have patient get CMP and TSH done next time she is due for labs,

## 2021-09-02 ENCOUNTER — HOSPITAL ENCOUNTER (OUTPATIENT)
Dept: MAMMOGRAPHY | Facility: HOSPITAL | Age: 51
Discharge: HOME | End: 2021-09-02
Attending: FAMILY MEDICINE
Payer: COMMERCIAL

## 2021-09-02 DIAGNOSIS — Z12.31 ENCOUNTER FOR SCREENING MAMMOGRAM FOR MALIGNANT NEOPLASM OF BREAST: ICD-10-CM

## 2021-09-02 PROCEDURE — 77067 SCR MAMMO BI INCL CAD: CPT | Performed by: FAMILY MEDICINE

## 2021-09-02 PROCEDURE — 77063 BREAST TOMOSYNTHESIS BI: CPT | Performed by: FAMILY MEDICINE

## 2021-10-14 ENCOUNTER — TELEPHONE (OUTPATIENT)
Dept: INTERNAL MEDICINE CLINIC | Facility: CLINIC | Age: 51
End: 2021-10-14

## 2021-10-20 DIAGNOSIS — E78.00 HYPERCHOLESTEREMIA: ICD-10-CM

## 2021-10-21 ENCOUNTER — TELEPHONE (OUTPATIENT)
Dept: FAMILY MEDICINE CLINIC | Facility: CLINIC | Age: 51
End: 2021-10-21

## 2021-10-21 NOTE — TELEPHONE ENCOUNTER
Ada Burk MD   9/3/2021  4:35 PM CDT         Your mammogram is stable with benign appearing density.  Next in 1yr

## 2021-10-22 RX ORDER — SIMVASTATIN 20 MG
TABLET ORAL
Qty: 30 TABLET | Refills: 0 | Status: SHIPPED | OUTPATIENT
Start: 2021-10-22 | End: 2021-12-07

## 2021-12-06 DIAGNOSIS — E78.00 HYPERCHOLESTEREMIA: ICD-10-CM

## 2021-12-07 RX ORDER — SIMVASTATIN 20 MG
TABLET ORAL
Qty: 30 TABLET | Refills: 0 | Status: SHIPPED | OUTPATIENT
Start: 2021-12-07 | End: 2022-01-12

## 2021-12-20 ENCOUNTER — TELEPHONE (OUTPATIENT)
Dept: FAMILY MEDICINE CLINIC | Facility: CLINIC | Age: 51
End: 2021-12-20

## 2021-12-20 DIAGNOSIS — F51.04 PSYCHOPHYSIOLOGICAL INSOMNIA: ICD-10-CM

## 2022-01-07 DIAGNOSIS — E78.00 HYPERCHOLESTEREMIA: ICD-10-CM

## 2022-01-12 RX ORDER — SIMVASTATIN 20 MG
TABLET ORAL
Qty: 30 TABLET | Refills: 0 | Status: SHIPPED | OUTPATIENT
Start: 2022-01-12

## 2022-02-09 ENCOUNTER — OFFICE VISIT (OUTPATIENT)
Dept: FAMILY MEDICINE CLINIC | Facility: CLINIC | Age: 52
End: 2022-02-09
Payer: COMMERCIAL

## 2022-02-09 VITALS
TEMPERATURE: 98 F | SYSTOLIC BLOOD PRESSURE: 90 MMHG | HEIGHT: 63 IN | OXYGEN SATURATION: 98 % | BODY MASS INDEX: 26.58 KG/M2 | DIASTOLIC BLOOD PRESSURE: 62 MMHG | WEIGHT: 150 LBS | HEART RATE: 65 BPM

## 2022-02-09 DIAGNOSIS — F51.04 PSYCHOPHYSIOLOGICAL INSOMNIA: ICD-10-CM

## 2022-02-09 DIAGNOSIS — E78.00 HYPERCHOLESTEREMIA: ICD-10-CM

## 2022-02-09 DIAGNOSIS — E03.9 HYPOTHYROIDISM, UNSPECIFIED TYPE: ICD-10-CM

## 2022-02-09 DIAGNOSIS — Z12.11 COLON CANCER SCREENING: Primary | ICD-10-CM

## 2022-02-09 PROCEDURE — 3078F DIAST BP <80 MM HG: CPT | Performed by: FAMILY MEDICINE

## 2022-02-09 PROCEDURE — 3008F BODY MASS INDEX DOCD: CPT | Performed by: FAMILY MEDICINE

## 2022-02-09 PROCEDURE — 3074F SYST BP LT 130 MM HG: CPT | Performed by: FAMILY MEDICINE

## 2022-02-09 PROCEDURE — 99214 OFFICE O/P EST MOD 30 MIN: CPT | Performed by: FAMILY MEDICINE

## 2022-02-09 RX ORDER — ZOLPIDEM TARTRATE 5 MG/1
5 TABLET ORAL NIGHTLY PRN
Qty: 90 TABLET | Refills: 1 | Status: SHIPPED | OUTPATIENT
Start: 2022-02-09

## 2022-02-09 RX ORDER — LEVOTHYROXINE SODIUM 0.12 MG/1
62.5 TABLET ORAL
Qty: 90 TABLET | Refills: 1 | Status: SHIPPED | OUTPATIENT
Start: 2022-02-09

## 2022-02-09 RX ORDER — SIMVASTATIN 20 MG
20 TABLET ORAL NIGHTLY
Qty: 90 TABLET | Refills: 1 | Status: SHIPPED | OUTPATIENT
Start: 2022-02-09

## 2022-02-14 ENCOUNTER — NURSE ONLY (OUTPATIENT)
Dept: FAMILY MEDICINE CLINIC | Facility: CLINIC | Age: 52
End: 2022-02-14
Payer: COMMERCIAL

## 2022-02-14 NOTE — PROGRESS NOTES
Patient came by to dropped off fit test for quest. spiecman was collected and sent to the lab for further testing.

## 2022-02-15 DIAGNOSIS — E03.9 HYPOTHYROIDISM, UNSPECIFIED TYPE: ICD-10-CM

## 2022-02-15 RX ORDER — LEVOTHYROXINE SODIUM 0.12 MG/1
TABLET ORAL
Qty: 90 TABLET | Refills: 1 | OUTPATIENT
Start: 2022-02-15

## 2022-02-17 DIAGNOSIS — F51.04 PSYCHOPHYSIOLOGICAL INSOMNIA: ICD-10-CM

## 2022-02-17 RX ORDER — ZOLPIDEM TARTRATE 5 MG/1
TABLET ORAL
Qty: 90 TABLET | Refills: 0 | OUTPATIENT
Start: 2022-02-17

## 2022-08-10 ENCOUNTER — OFFICE VISIT (OUTPATIENT)
Dept: INTERNAL MEDICINE CLINIC | Facility: CLINIC | Age: 52
End: 2022-08-10
Payer: COMMERCIAL

## 2022-08-10 VITALS
DIASTOLIC BLOOD PRESSURE: 80 MMHG | WEIGHT: 150 LBS | OXYGEN SATURATION: 99 % | SYSTOLIC BLOOD PRESSURE: 118 MMHG | TEMPERATURE: 98 F | BODY MASS INDEX: 26.58 KG/M2 | HEART RATE: 57 BPM | HEIGHT: 63 IN

## 2022-08-10 DIAGNOSIS — E78.00 HYPERCHOLESTEREMIA: ICD-10-CM

## 2022-08-10 DIAGNOSIS — Z00.00 WELLNESS EXAMINATION: Primary | ICD-10-CM

## 2022-08-10 DIAGNOSIS — E03.9 HYPOTHYROIDISM, UNSPECIFIED TYPE: ICD-10-CM

## 2022-08-10 DIAGNOSIS — Z12.31 ENCOUNTER FOR SCREENING MAMMOGRAM FOR MALIGNANT NEOPLASM OF BREAST: ICD-10-CM

## 2022-08-10 PROCEDURE — 99212 OFFICE O/P EST SF 10 MIN: CPT | Performed by: FAMILY MEDICINE

## 2022-08-10 PROCEDURE — 99396 PREV VISIT EST AGE 40-64: CPT | Performed by: FAMILY MEDICINE

## 2022-08-10 PROCEDURE — 3008F BODY MASS INDEX DOCD: CPT | Performed by: FAMILY MEDICINE

## 2022-08-10 PROCEDURE — 3074F SYST BP LT 130 MM HG: CPT | Performed by: FAMILY MEDICINE

## 2022-08-10 PROCEDURE — 3079F DIAST BP 80-89 MM HG: CPT | Performed by: FAMILY MEDICINE

## 2022-08-10 RX ORDER — DOXYCYCLINE HYCLATE 100 MG/1
100 CAPSULE ORAL EVERY 12 HOURS
COMMUNITY
Start: 2022-04-14 | End: 2022-08-10 | Stop reason: ALTCHOICE

## 2022-08-10 RX ORDER — PREDNISONE 20 MG/1
1 TABLET ORAL AS DIRECTED
COMMUNITY
End: 2022-08-10 | Stop reason: ALTCHOICE

## 2022-08-10 RX ORDER — HYDROCORTISONE 20 MG/1
TABLET ORAL
COMMUNITY
End: 2022-08-10 | Stop reason: ALTCHOICE

## 2022-08-10 RX ORDER — CHLORHEXIDINE GLUCONATE 0.12 MG/ML
RINSE ORAL
COMMUNITY
End: 2022-08-10 | Stop reason: ALTCHOICE

## 2022-08-10 RX ORDER — CLOTRIMAZOLE 10 MG/1
LOZENGE ORAL; TOPICAL
COMMUNITY
End: 2022-08-10 | Stop reason: ALTCHOICE

## 2022-08-10 RX ORDER — ACETAMINOPHEN AND CODEINE PHOSPHATE 300; 30 MG/1; MG/1
TABLET ORAL
COMMUNITY
End: 2022-08-10 | Stop reason: ALTCHOICE

## 2022-08-10 RX ORDER — NITROFURANTOIN 25; 75 MG/1; MG/1
CAPSULE ORAL
COMMUNITY
End: 2022-08-10 | Stop reason: ALTCHOICE

## 2022-08-10 RX ORDER — DEXAMETHASONE 0.5 MG/5ML
ELIXIR ORAL
COMMUNITY
End: 2022-08-10 | Stop reason: ALTCHOICE

## 2022-08-20 ENCOUNTER — TELEPHONE (OUTPATIENT)
Dept: INTERNAL MEDICINE CLINIC | Facility: CLINIC | Age: 52
End: 2022-08-20

## 2022-08-20 ENCOUNTER — NURSE ONLY (OUTPATIENT)
Dept: INTERNAL MEDICINE CLINIC | Facility: CLINIC | Age: 52
End: 2022-08-20
Payer: COMMERCIAL

## 2022-08-20 DIAGNOSIS — E03.9 HYPOTHYROIDISM, UNSPECIFIED TYPE: ICD-10-CM

## 2022-08-20 DIAGNOSIS — E78.00 HYPERCHOLESTEREMIA: ICD-10-CM

## 2022-08-20 PROCEDURE — 36415 COLL VENOUS BLD VENIPUNCTURE: CPT | Performed by: FAMILY MEDICINE

## 2022-08-20 NOTE — TELEPHONE ENCOUNTER
simvastatin 20 MG Oral Tab   levothyroxine 125 MCG Oral Tab       1 month supply until her test results are back

## 2022-08-21 LAB
ABSOLUTE BASOPHILS: 10 CELLS/UL (ref 0–200)
ABSOLUTE EOSINOPHILS: 49 CELLS/UL (ref 15–500)
ABSOLUTE LYMPHOCYTES: 2063 CELLS/UL (ref 850–3900)
ABSOLUTE MONOCYTES: 279 CELLS/UL (ref 200–950)
ABSOLUTE NEUTROPHILS: 2499 CELLS/UL (ref 1500–7800)
ALBUMIN/GLOBULIN RATIO: 1.6 (CALC) (ref 1–2.5)
ALBUMIN: 4.2 G/DL (ref 3.6–5.1)
ALKALINE PHOSPHATASE: 111 U/L (ref 37–153)
ALT: 15 U/L (ref 6–29)
APPEARANCE: CLEAR
AST: 19 U/L (ref 10–35)
BASOPHILS: 0.2 %
BILIRUBIN, TOTAL: 1.1 MG/DL (ref 0.2–1.2)
BILIRUBIN: NEGATIVE
BUN: 10 MG/DL (ref 7–25)
CALCIUM: 9.2 MG/DL (ref 8.6–10.4)
CARBON DIOXIDE: 23 MMOL/L (ref 20–32)
CHLORIDE: 105 MMOL/L (ref 98–110)
CHOL/HDLC RATIO: 2.2 (CALC)
CHOLESTEROL, TOTAL: 165 MG/DL
COLOR: YELLOW
CREATININE: 0.62 MG/DL (ref 0.5–1.03)
EGFR: 106 ML/MIN/1.73M2
EOSINOPHILS: 1 %
GLOBULIN: 2.6 G/DL (CALC) (ref 1.9–3.7)
GLUCOSE: 95 MG/DL (ref 65–99)
GLUCOSE: NEGATIVE
HDL CHOLESTEROL: 76 MG/DL
HEMATOCRIT: 40.9 % (ref 35–45)
HEMOGLOBIN A1C: 5.4 % OF TOTAL HGB
HEMOGLOBIN: 13.6 G/DL (ref 11.7–15.5)
KETONES: NEGATIVE
LDL-CHOLESTEROL: 73 MG/DL (CALC)
LYMPHOCYTES: 42.1 %
MCH: 30 PG (ref 27–33)
MCHC: 33.3 G/DL (ref 32–36)
MCV: 90.1 FL (ref 80–100)
MONOCYTES: 5.7 %
MPV: 9.9 FL (ref 7.5–12.5)
NEUTROPHILS: 51 %
NITRITE: NEGATIVE
NON-HDL CHOLESTEROL: 89 MG/DL (CALC)
OCCULT BLOOD: NEGATIVE
PH: 7.5 (ref 5–8)
PLATELET COUNT: 251 THOUSAND/UL (ref 140–400)
POTASSIUM: 4.5 MMOL/L (ref 3.5–5.3)
PROTEIN, TOTAL: 6.8 G/DL (ref 6.1–8.1)
PROTEIN: NEGATIVE
RDW: 13 % (ref 11–15)
RED BLOOD CELL COUNT: 4.54 MILLION/UL (ref 3.8–5.1)
SODIUM: 137 MMOL/L (ref 135–146)
SPECIFIC GRAVITY: 1.01 (ref 1–1.03)
TRIGLYCERIDES: 77 MG/DL
TSH W/REFLEX TO FT4: 1.14 MIU/L
WHITE BLOOD CELL COUNT: 4.9 THOUSAND/UL (ref 3.8–10.8)

## 2022-08-22 DIAGNOSIS — E03.9 HYPOTHYROIDISM, UNSPECIFIED TYPE: ICD-10-CM

## 2022-08-22 DIAGNOSIS — E78.00 HYPERCHOLESTEREMIA: ICD-10-CM

## 2022-08-22 RX ORDER — LEVOTHYROXINE SODIUM 0.12 MG/1
62.5 TABLET ORAL
Qty: 15 TABLET | Refills: 0 | Status: SHIPPED | OUTPATIENT
Start: 2022-08-22

## 2022-08-22 RX ORDER — SIMVASTATIN 20 MG
20 TABLET ORAL NIGHTLY
Qty: 30 TABLET | Refills: 0 | Status: SHIPPED | OUTPATIENT
Start: 2022-08-22

## 2022-08-22 RX ORDER — SIMVASTATIN 20 MG
TABLET ORAL
Qty: 90 TABLET | Refills: 0 | OUTPATIENT
Start: 2022-08-22

## 2022-08-23 RX ORDER — LEVOTHYROXINE SODIUM 0.12 MG/1
TABLET ORAL
Qty: 45 TABLET | Refills: 0 | OUTPATIENT
Start: 2022-08-23

## 2022-08-31 DIAGNOSIS — F51.04 PSYCHOPHYSIOLOGICAL INSOMNIA: ICD-10-CM

## 2022-09-02 ENCOUNTER — TELEPHONE (OUTPATIENT)
Dept: INTERNAL MEDICINE CLINIC | Facility: CLINIC | Age: 52
End: 2022-09-02

## 2022-09-02 DIAGNOSIS — F51.04 PSYCHOPHYSIOLOGICAL INSOMNIA: ICD-10-CM

## 2022-09-02 RX ORDER — ZOLPIDEM TARTRATE 5 MG/1
5 TABLET ORAL NIGHTLY PRN
Qty: 90 TABLET | Refills: 1 | Status: SHIPPED | OUTPATIENT
Start: 2022-09-02

## 2022-09-02 RX ORDER — ZOLPIDEM TARTRATE 5 MG/1
TABLET ORAL
Qty: 90 TABLET | Refills: 0 | OUTPATIENT
Start: 2022-09-02

## 2022-09-02 NOTE — TELEPHONE ENCOUNTER
Called patient,  verified, results below given, patient is requesting refill for Zolpidem, per patient she takes it every night she states  is aware of this

## 2022-09-06 ENCOUNTER — HOSPITAL ENCOUNTER (OUTPATIENT)
Dept: MAMMOGRAPHY | Facility: HOSPITAL | Age: 52
Discharge: HOME | End: 2022-09-06
Attending: FAMILY MEDICINE
Payer: COMMERCIAL

## 2022-09-06 DIAGNOSIS — Z12.31 ENCOUNTER FOR SCREENING MAMMOGRAM FOR MALIGNANT NEOPLASM OF BREAST: ICD-10-CM

## 2022-09-06 PROCEDURE — 77067 SCR MAMMO BI INCL CAD: CPT | Performed by: FAMILY MEDICINE

## 2022-09-06 PROCEDURE — 77063 BREAST TOMOSYNTHESIS BI: CPT | Performed by: FAMILY MEDICINE

## 2022-09-21 DIAGNOSIS — E78.00 HYPERCHOLESTEREMIA: ICD-10-CM

## 2022-09-21 DIAGNOSIS — E03.9 HYPOTHYROIDISM, UNSPECIFIED TYPE: ICD-10-CM

## 2022-09-21 RX ORDER — LEVOTHYROXINE SODIUM 0.12 MG/1
TABLET ORAL
Qty: 15 TABLET | Refills: 0 | OUTPATIENT
Start: 2022-09-21

## 2022-09-21 RX ORDER — SIMVASTATIN 20 MG
TABLET ORAL
Qty: 30 TABLET | Refills: 0 | OUTPATIENT
Start: 2022-09-21

## 2023-02-08 ENCOUNTER — OFFICE VISIT (OUTPATIENT)
Dept: INTERNAL MEDICINE CLINIC | Facility: CLINIC | Age: 53
End: 2023-02-08
Payer: COMMERCIAL

## 2023-02-08 VITALS
OXYGEN SATURATION: 98 % | HEART RATE: 69 BPM | WEIGHT: 151 LBS | BODY MASS INDEX: 27.44 KG/M2 | SYSTOLIC BLOOD PRESSURE: 106 MMHG | DIASTOLIC BLOOD PRESSURE: 68 MMHG | TEMPERATURE: 98 F | HEIGHT: 62.21 IN

## 2023-02-08 DIAGNOSIS — Z12.11 COLON CANCER SCREENING: ICD-10-CM

## 2023-02-08 DIAGNOSIS — K29.30 SUPERFICIAL GASTRITIS WITHOUT HEMORRHAGE, UNSPECIFIED CHRONICITY: ICD-10-CM

## 2023-02-08 DIAGNOSIS — E78.00 HYPERCHOLESTEREMIA: ICD-10-CM

## 2023-02-08 DIAGNOSIS — E03.9 HYPOTHYROIDISM, UNSPECIFIED TYPE: Primary | ICD-10-CM

## 2023-02-08 DIAGNOSIS — F51.04 PSYCHOPHYSIOLOGICAL INSOMNIA: ICD-10-CM

## 2023-02-08 PROCEDURE — 99214 OFFICE O/P EST MOD 30 MIN: CPT | Performed by: FAMILY MEDICINE

## 2023-02-08 PROCEDURE — 3078F DIAST BP <80 MM HG: CPT | Performed by: FAMILY MEDICINE

## 2023-02-08 PROCEDURE — 3008F BODY MASS INDEX DOCD: CPT | Performed by: FAMILY MEDICINE

## 2023-02-08 PROCEDURE — 3074F SYST BP LT 130 MM HG: CPT | Performed by: FAMILY MEDICINE

## 2023-02-08 RX ORDER — ZOLPIDEM TARTRATE 5 MG/1
5 TABLET ORAL NIGHTLY PRN
Qty: 90 TABLET | Refills: 1 | Status: SHIPPED | OUTPATIENT
Start: 2023-02-08

## 2023-02-08 RX ORDER — FAMOTIDINE 20 MG/1
20 TABLET, FILM COATED ORAL 2 TIMES DAILY PRN
Qty: 60 TABLET | Refills: 1 | Status: SHIPPED | OUTPATIENT
Start: 2023-02-08 | End: 2023-02-11

## 2023-02-08 RX ORDER — SIMVASTATIN 20 MG
20 TABLET ORAL NIGHTLY
Qty: 90 TABLET | Refills: 1 | Status: SHIPPED | OUTPATIENT
Start: 2023-02-08

## 2023-02-08 RX ORDER — LEVOTHYROXINE SODIUM 0.12 MG/1
62.5 TABLET ORAL
Qty: 90 TABLET | Refills: 1 | Status: SHIPPED | OUTPATIENT
Start: 2023-02-08

## 2023-02-11 RX ORDER — FAMOTIDINE 20 MG/1
TABLET, FILM COATED ORAL
Qty: 180 TABLET | Refills: 0 | Status: SHIPPED | OUTPATIENT
Start: 2023-02-11

## 2023-04-13 ENCOUNTER — TELEPHONE (OUTPATIENT)
Facility: CLINIC | Age: 53
End: 2023-04-13

## 2023-04-13 ENCOUNTER — OFFICE VISIT (OUTPATIENT)
Dept: GASTROENTEROLOGY | Facility: CLINIC | Age: 53
End: 2023-04-13

## 2023-04-13 VITALS
HEIGHT: 62.21 IN | WEIGHT: 149 LBS | SYSTOLIC BLOOD PRESSURE: 95 MMHG | DIASTOLIC BLOOD PRESSURE: 61 MMHG | HEART RATE: 67 BPM | BODY MASS INDEX: 27.07 KG/M2

## 2023-04-13 DIAGNOSIS — Z12.11 COLON CANCER SCREENING: Primary | ICD-10-CM

## 2023-04-13 DIAGNOSIS — Z12.11 SCREEN FOR COLON CANCER: Primary | ICD-10-CM

## 2023-04-13 PROCEDURE — 99203 OFFICE O/P NEW LOW 30 MIN: CPT | Performed by: NURSE PRACTITIONER

## 2023-04-13 PROCEDURE — 3078F DIAST BP <80 MM HG: CPT | Performed by: NURSE PRACTITIONER

## 2023-04-13 PROCEDURE — 3074F SYST BP LT 130 MM HG: CPT | Performed by: NURSE PRACTITIONER

## 2023-04-13 PROCEDURE — 3008F BODY MASS INDEX DOCD: CPT | Performed by: NURSE PRACTITIONER

## 2023-04-13 RX ORDER — POLYETHYLENE GLYCOL 3350, SODIUM CHLORIDE, SODIUM BICARBONATE, POTASSIUM CHLORIDE 420; 11.2; 5.72; 1.48 G/4L; G/4L; G/4L; G/4L
POWDER, FOR SOLUTION ORAL
Qty: 4000 ML | Refills: 0 | Status: SHIPPED | OUTPATIENT
Start: 2023-04-13

## 2023-04-13 NOTE — PATIENT INSTRUCTIONS
1. Schedule colonoscopy with [facility] w/ Dr. Freddy Morales or Dr. Chacha Mendez [Diagnosis: crc screening]    2.  bowel prep from pharmacy ([facility])    3. Continue all medications as normal for your procedure. 4. Read all bowel prep instructions carefully. Bowel prep instructions can also be found online at:  [email or web address]     5. AVOID seeds, nuts, popcorn, raw fruits and vegetables for 3 days before procedure    6. You MAY need to go for COVID testing 72 hours before procedure. The testing team will call you a few days before your procedure to discuss with you if testing is required. If you are asked to go for COVID testing and do not completed the test, the procedure cannot be performed. 7. If you start any NEW medication after your visit today, please notify us. Certain medications (like iron or weight loss medications) will need to be held before the procedure, or the procedure cannot be performed safely.

## 2023-04-13 NOTE — TELEPHONE ENCOUNTER
Scheduled for:  Colonoscopy [id number]  Provider Name:  Dr Carly Murguia  Date: 06/19/2023    Location:  [facility]  Sedation:  [facility]   Time: 12:30pm (pt is aware that Jennifer Johnson will call the day before to confirm arrival time)  Prep:  [facility] Prep Instructions Given At The Office Visit. Meds/Allergies Reconciled?:  Franciso Aschoff, NP Reviewed  Diagnosis with codes:  Colon Screening Z12.11  Was patient informed to call insurance with codes (Y/N):  Yes  Referral sent?:  Yes  [facility] or [id number] notified?:  Electronic case request was sent to Jennifer Johnson via [employer]. Medication Orders:  Pt is aware to NOT take iron pills, herbal meds and diet supplements for 7 days before exam. Also to NOT take any form of alcohol, recreational drugs and any forms of ED meds 24 hours before exam.   Misc Orders:       Further instructions given by staff:  I provide prep instructions to patient at the time of the appointment and reviewed date, time and location, she verbalized that she understood and is aware to call if she has any questions.

## 2023-05-03 ENCOUNTER — OFFICE VISIT (OUTPATIENT)
Dept: FAMILY MEDICINE CLINIC | Facility: CLINIC | Age: 53
End: 2023-05-03
Payer: COMMERCIAL

## 2023-05-03 VITALS
HEART RATE: 70 BPM | TEMPERATURE: 99 F | DIASTOLIC BLOOD PRESSURE: 59 MMHG | RESPIRATION RATE: 20 BRPM | WEIGHT: 147 LBS | OXYGEN SATURATION: 100 % | HEIGHT: 63 IN | BODY MASS INDEX: 26.05 KG/M2 | SYSTOLIC BLOOD PRESSURE: 107 MMHG

## 2023-05-03 DIAGNOSIS — U07.1 COVID-19: ICD-10-CM

## 2023-05-03 DIAGNOSIS — J02.9 SORE THROAT: Primary | ICD-10-CM

## 2023-05-03 LAB
OPERATOR ID: ABNORMAL
POCT EXPIRATION DATE: ABNORMAL
RAPID SARS-COV-2 BY PCR: DETECTED

## 2023-05-03 PROCEDURE — 3074F SYST BP LT 130 MM HG: CPT | Performed by: NURSE PRACTITIONER

## 2023-05-03 PROCEDURE — 3078F DIAST BP <80 MM HG: CPT | Performed by: NURSE PRACTITIONER

## 2023-05-03 PROCEDURE — 99213 OFFICE O/P EST LOW 20 MIN: CPT | Performed by: NURSE PRACTITIONER

## 2023-05-03 PROCEDURE — U0002 COVID-19 LAB TEST NON-CDC: HCPCS | Performed by: NURSE PRACTITIONER

## 2023-05-03 PROCEDURE — 3008F BODY MASS INDEX DOCD: CPT | Performed by: NURSE PRACTITIONER

## 2023-06-19 ENCOUNTER — LAB REQUISITION (OUTPATIENT)
Dept: SURGERY | Age: 53
End: 2023-06-19
Payer: COMMERCIAL

## 2023-06-19 ENCOUNTER — SURGERY CENTER DOCUMENTATION (OUTPATIENT)
Dept: SURGERY | Age: 53
End: 2023-06-19

## 2023-06-19 DIAGNOSIS — K64.8 INTERNAL HEMORRHOIDS: ICD-10-CM

## 2023-06-19 DIAGNOSIS — K63.5 POLYP OF ASCENDING COLON, UNSPECIFIED TYPE: ICD-10-CM

## 2023-06-19 DIAGNOSIS — Z12.11 SCREEN FOR COLON CANCER: ICD-10-CM

## 2023-06-19 PROCEDURE — 88305 TISSUE EXAM BY PATHOLOGIST: CPT | Performed by: INTERNAL MEDICINE

## 2023-06-28 ENCOUNTER — TELEPHONE (OUTPATIENT)
Facility: CLINIC | Age: 53
End: 2023-06-28

## 2023-08-19 ENCOUNTER — OFFICE VISIT (OUTPATIENT)
Dept: INTERNAL MEDICINE CLINIC | Facility: CLINIC | Age: 53
End: 2023-08-19
Payer: COMMERCIAL

## 2023-08-19 ENCOUNTER — LAB ENCOUNTER (OUTPATIENT)
Dept: LAB | Facility: HOSPITAL | Age: 53
End: 2023-08-19
Attending: FAMILY MEDICINE
Payer: COMMERCIAL

## 2023-08-19 VITALS
WEIGHT: 149 LBS | BODY MASS INDEX: 27.07 KG/M2 | HEIGHT: 62.21 IN | DIASTOLIC BLOOD PRESSURE: 66 MMHG | TEMPERATURE: 98 F | OXYGEN SATURATION: 98 % | HEART RATE: 60 BPM | SYSTOLIC BLOOD PRESSURE: 94 MMHG

## 2023-08-19 DIAGNOSIS — Z00.00 WELLNESS EXAMINATION: Primary | ICD-10-CM

## 2023-08-19 DIAGNOSIS — Z00.00 WELLNESS EXAMINATION: ICD-10-CM

## 2023-08-19 DIAGNOSIS — K13.79 LESION OF HARD PALATE: ICD-10-CM

## 2023-08-19 DIAGNOSIS — Z12.31 ENCOUNTER FOR SCREENING MAMMOGRAM FOR MALIGNANT NEOPLASM OF BREAST: ICD-10-CM

## 2023-08-19 DIAGNOSIS — Z80.0 FAMILY HISTORY OF ORAL CANCER: ICD-10-CM

## 2023-08-19 DIAGNOSIS — E03.9 HYPOTHYROIDISM, UNSPECIFIED TYPE: ICD-10-CM

## 2023-08-19 DIAGNOSIS — F51.04 PSYCHOPHYSIOLOGICAL INSOMNIA: ICD-10-CM

## 2023-08-19 DIAGNOSIS — E78.00 HYPERCHOLESTEREMIA: ICD-10-CM

## 2023-08-19 LAB
ALBUMIN SERPL-MCNC: 3.7 G/DL (ref 3.4–5)
ALBUMIN/GLOB SERPL: 1.1 {RATIO} (ref 1–2)
ALP LIVER SERPL-CCNC: 113 U/L
ALT SERPL-CCNC: 23 U/L
ANION GAP SERPL CALC-SCNC: 5 MMOL/L (ref 0–18)
AST SERPL-CCNC: 24 U/L (ref 15–37)
BASOPHILS # BLD AUTO: 0.02 X10(3) UL (ref 0–0.2)
BASOPHILS NFR BLD AUTO: 0.3 %
BILIRUB SERPL-MCNC: 1.3 MG/DL (ref 0.1–2)
BILIRUB UR QL: NEGATIVE
BUN BLD-MCNC: 13 MG/DL (ref 7–18)
BUN/CREAT SERPL: 20 (ref 10–20)
CALCIUM BLD-MCNC: 8.8 MG/DL (ref 8.5–10.1)
CHLORIDE SERPL-SCNC: 107 MMOL/L (ref 98–112)
CHOLEST SERPL-MCNC: 160 MG/DL (ref ?–200)
CLARITY UR: CLEAR
CO2 SERPL-SCNC: 27 MMOL/L (ref 21–32)
CREAT BLD-MCNC: 0.65 MG/DL
DEPRECATED RDW RBC AUTO: 44.2 FL (ref 35.1–46.3)
EGFRCR SERPLBLD CKD-EPI 2021: 105 ML/MIN/1.73M2 (ref 60–?)
EOSINOPHIL # BLD AUTO: 0.06 X10(3) UL (ref 0–0.7)
EOSINOPHIL NFR BLD AUTO: 1 %
ERYTHROCYTE [DISTWIDTH] IN BLOOD BY AUTOMATED COUNT: 13 % (ref 11–15)
EST. AVERAGE GLUCOSE BLD GHB EST-MCNC: 114 MG/DL (ref 68–126)
FASTING PATIENT LIPID ANSWER: YES
FASTING STATUS PATIENT QL REPORTED: YES
GLOBULIN PLAS-MCNC: 3.3 G/DL (ref 2.8–4.4)
GLUCOSE BLD-MCNC: 86 MG/DL (ref 70–99)
GLUCOSE UR-MCNC: NORMAL MG/DL
HBA1C MFR BLD: 5.6 % (ref ?–5.7)
HCT VFR BLD AUTO: 41.1 %
HDLC SERPL-MCNC: 78 MG/DL (ref 40–59)
HGB BLD-MCNC: 13.4 G/DL
HGB UR QL STRIP.AUTO: NEGATIVE
IMM GRANULOCYTES # BLD AUTO: 0.02 X10(3) UL (ref 0–1)
IMM GRANULOCYTES NFR BLD: 0.3 %
KETONES UR-MCNC: NEGATIVE MG/DL
LDLC SERPL CALC-MCNC: 69 MG/DL (ref ?–100)
LEUKOCYTE ESTERASE UR QL STRIP.AUTO: 75
LYMPHOCYTES # BLD AUTO: 2.36 X10(3) UL (ref 1–4)
LYMPHOCYTES NFR BLD AUTO: 38.2 %
MCH RBC QN AUTO: 30 PG (ref 26–34)
MCHC RBC AUTO-ENTMCNC: 32.6 G/DL (ref 31–37)
MCV RBC AUTO: 92.2 FL
MONOCYTES # BLD AUTO: 0.45 X10(3) UL (ref 0.1–1)
MONOCYTES NFR BLD AUTO: 7.3 %
NEUTROPHILS # BLD AUTO: 3.26 X10 (3) UL (ref 1.5–7.7)
NEUTROPHILS # BLD AUTO: 3.26 X10(3) UL (ref 1.5–7.7)
NEUTROPHILS NFR BLD AUTO: 52.9 %
NITRITE UR QL STRIP.AUTO: NEGATIVE
NONHDLC SERPL-MCNC: 82 MG/DL (ref ?–130)
OSMOLALITY SERPL CALC.SUM OF ELEC: 287 MOSM/KG (ref 275–295)
PH UR: 7 [PH] (ref 5–8)
PLATELET # BLD AUTO: 240 10(3)UL (ref 150–450)
POTASSIUM SERPL-SCNC: 4 MMOL/L (ref 3.5–5.1)
PROT SERPL-MCNC: 7 G/DL (ref 6.4–8.2)
PROT UR-MCNC: NEGATIVE MG/DL
RBC # BLD AUTO: 4.46 X10(6)UL
SODIUM SERPL-SCNC: 139 MMOL/L (ref 136–145)
SP GR UR STRIP: 1.01 (ref 1–1.03)
TRIGL SERPL-MCNC: 66 MG/DL (ref 30–149)
TSI SER-ACNC: 1.12 MIU/ML (ref 0.36–3.74)
UROBILINOGEN UR STRIP-ACNC: NORMAL
VLDLC SERPL CALC-MCNC: 10 MG/DL (ref 0–30)
WBC # BLD AUTO: 6.2 X10(3) UL (ref 4–11)

## 2023-08-19 PROCEDURE — 83036 HEMOGLOBIN GLYCOSYLATED A1C: CPT

## 2023-08-19 PROCEDURE — 36415 COLL VENOUS BLD VENIPUNCTURE: CPT

## 2023-08-19 PROCEDURE — 80053 COMPREHEN METABOLIC PANEL: CPT

## 2023-08-19 PROCEDURE — 81001 URINALYSIS AUTO W/SCOPE: CPT

## 2023-08-19 PROCEDURE — 84443 ASSAY THYROID STIM HORMONE: CPT

## 2023-08-19 PROCEDURE — 85025 COMPLETE CBC W/AUTO DIFF WBC: CPT | Performed by: FAMILY MEDICINE

## 2023-08-19 PROCEDURE — 80061 LIPID PANEL: CPT

## 2023-08-25 ENCOUNTER — OFFICE VISIT (OUTPATIENT)
Dept: OTOLARYNGOLOGY | Facility: CLINIC | Age: 53
End: 2023-08-25

## 2023-08-25 DIAGNOSIS — K13.79 LESION OF HARD PALATE: Primary | ICD-10-CM

## 2023-08-25 DIAGNOSIS — Z80.0 FAMILY HISTORY OF ORAL CANCER: ICD-10-CM

## 2023-08-25 PROCEDURE — 88305 TISSUE EXAM BY PATHOLOGIST: CPT | Performed by: STUDENT IN AN ORGANIZED HEALTH CARE EDUCATION/TRAINING PROGRAM

## 2023-08-25 NOTE — PROGRESS NOTES
Anastasia Cuello is a 46year old female. Patient presents with:  Lesion: Pt reports inflammation of her palate, states it started after surgery of her thyroid. Hx of Oral cancer in family. ASSESSMENT AND PLAN:   1. Lesion of hard palate  58-year-old presents with a tender lesion on her midline anterior hard palate. She states it has been there for several years she thinks that began after his surgery for her thyroid. She states a history of Oral cancer in her family and she is worried about this. It does not bleed. On exam of her anterior midline hard palate there is a approximately 1 cm x 0.25 cm oblong shaped submucosal firm protrusion. No mucosal irregularities. Does not necessarily look suspicious although could represent some form of submucosal pleomorphic adenoma or other neoplasm. A biopsy was taken of this today and sent for permanent pathology. We will follow-up on results. Consult from Dr. Julita Blankenship regarding palate lesion    2. Family history of oral cancer        The patient indicates understanding of these issues and agrees to the plan. EXAM:   There were no vitals taken for this visit.     Pertinent exam findings may also be noted above in assessment and plan     System Details   Skin Inspection - Normal.   Constitutional Overall appearance - Normal.   Head/Face Symmetric, TMJ tenderness not present    Eyes EOMI, PERRL   Right ear:  Canal clear, TM intact, no ENDY   Left ear:  Canal clear, TM intact, no ENDY   Nose: Septum midline, inferior turbinates not enlarged, nasal valves without collapse    Oral cavity/Oropharynx: No lesions or masses on inspection or palpation, tonsils symmetric    Neck: Soft without LAD, thyroid not enlarged  Voice clear/ no stridor   Other:      Scopes and Procedures:     Procedure: Biopsy of hard palate lesion  Consent obtained from patient  2cc of 1% lidocaine with 1:100,000 epinephrine injected into area of concern  After adequate anesthesia the lesion was excised with a 11 blade and scissors  This was sent for permament specimen  Silver nitrate and pressure applied for hemostasis  Patient tolerated this well        REVIEW OF SYSTEMS:   GENERAL HEALTH: feels well otherwise  GENERAL : denies fever, chills, sweats, weight loss, weight gain  SKIN: denies any unusual skin lesions or rashes  RESPIRATORY: denies shortness of breath with exertion  NEURO: denies headaches    Current Outpatient Medications   Medication Sig Dispense Refill    simvastatin 20 MG Oral Tab Take 1 tablet (20 mg total) by mouth nightly. 90 tablet 0    levothyroxine 125 MCG Oral Tab Take 0.5 tablets (62.5 mcg total) by mouth before breakfast. 90 tablet 1    zolpidem 5 MG Oral Tab Take 1 tablet (5 mg total) by mouth nightly as needed for Sleep.  90 tablet 1      Past Medical History:   Diagnosis Date    Hypothyroidism     Screen for colon cancer 2023    repeat CLN in 2033      Social History:  Social History     Socioeconomic History    Marital status:    Tobacco Use    Smoking status: Never    Smokeless tobacco: Never   Substance and Sexual Activity    Alcohol use: No    Drug use: No          Odell Rosado MD  8/25/2023  8:09 AM

## 2023-08-27 RX ORDER — LEVOTHYROXINE SODIUM 0.12 MG/1
62.5 TABLET ORAL
Qty: 90 TABLET | Refills: 3 | Status: SHIPPED | OUTPATIENT
Start: 2023-08-27

## 2023-08-27 RX ORDER — ZOLPIDEM TARTRATE 5 MG/1
5 TABLET ORAL NIGHTLY PRN
Qty: 90 TABLET | Refills: 1 | Status: SHIPPED | OUTPATIENT
Start: 2023-08-27

## 2023-08-27 RX ORDER — SIMVASTATIN 20 MG
20 TABLET ORAL NIGHTLY
Qty: 90 TABLET | Refills: 3 | Status: SHIPPED | OUTPATIENT
Start: 2023-08-27

## 2023-09-07 ENCOUNTER — TELEPHONE (OUTPATIENT)
Dept: INTERNAL MEDICINE CLINIC | Facility: CLINIC | Age: 53
End: 2023-09-07

## 2023-09-07 NOTE — TELEPHONE ENCOUNTER
Teodoro Leonza     Tus laboratorios demuestran:  -Tus electrolytas, ri~nones, e higado estan normal  -Tu colesterol y tiroide estan controlados  -El examen de la diabetes esta normal  -La orina tiene un poco de bacteria cual es de contaminacion ya que no tienes sintomas urinarios. Contiue mattie medicamentos sin cambios.      Rolly Lass,     Dr. Sadaf Mota   Written by Iveth Clemens MD on 8/28/2023  1:18 AM CDT  Seen by patient Precious Pelt on 8/29/2023  5:50 PM

## 2023-09-07 NOTE — TELEPHONE ENCOUNTER
----- Message from Lazarus Sally, MD sent at 9/5/2023  3:00 AM CDT -----  Please notify pt of the results as they have not read the [employer] message

## 2023-09-23 ENCOUNTER — HOSPITAL ENCOUNTER (OUTPATIENT)
Dept: MAMMOGRAPHY | Facility: HOSPITAL | Age: 53
Discharge: HOME | End: 2023-09-23
Attending: FAMILY MEDICINE
Payer: COMMERCIAL

## 2023-09-23 DIAGNOSIS — Z12.31 ENCOUNTER FOR SCREENING MAMMOGRAM FOR MALIGNANT NEOPLASM OF BREAST: ICD-10-CM

## 2023-09-23 PROCEDURE — 77063 BREAST TOMOSYNTHESIS BI: CPT | Performed by: FAMILY MEDICINE

## 2023-09-23 PROCEDURE — 77067 SCR MAMMO BI INCL CAD: CPT | Performed by: FAMILY MEDICINE

## 2023-10-09 ENCOUNTER — TELEPHONE (OUTPATIENT)
Dept: URGENT CARE | Age: 53
End: 2023-10-09

## 2023-10-10 ENCOUNTER — APPOINTMENT (OUTPATIENT)
Dept: URGENT CARE | Age: 53
End: 2023-10-10

## 2024-01-08 DIAGNOSIS — E78.00 HYPERCHOLESTEREMIA: ICD-10-CM

## 2024-01-09 RX ORDER — SIMVASTATIN 20 MG
20 TABLET ORAL NIGHTLY
Qty: 90 TABLET | Refills: 0 | Status: SHIPPED | OUTPATIENT
Start: 2024-01-09 | End: 2024-02-10

## 2024-02-10 ENCOUNTER — OFFICE VISIT (OUTPATIENT)
Dept: INTERNAL MEDICINE CLINIC | Facility: CLINIC | Age: 54
End: 2024-02-10
Payer: COMMERCIAL

## 2024-02-10 VITALS
HEIGHT: 62.21 IN | TEMPERATURE: 98 F | OXYGEN SATURATION: 96 % | WEIGHT: 145 LBS | BODY MASS INDEX: 26.35 KG/M2 | DIASTOLIC BLOOD PRESSURE: 62 MMHG | HEART RATE: 63 BPM | SYSTOLIC BLOOD PRESSURE: 98 MMHG

## 2024-02-10 DIAGNOSIS — F51.04 PSYCHOPHYSIOLOGICAL INSOMNIA: ICD-10-CM

## 2024-02-10 DIAGNOSIS — E03.9 HYPOTHYROIDISM, UNSPECIFIED TYPE: Primary | ICD-10-CM

## 2024-02-10 DIAGNOSIS — E78.00 HYPERCHOLESTEREMIA: ICD-10-CM

## 2024-02-10 DIAGNOSIS — Z23 NEED FOR VACCINATION: ICD-10-CM

## 2024-02-10 RX ORDER — SIMVASTATIN 20 MG
20 TABLET ORAL NIGHTLY
Qty: 90 TABLET | Refills: 1 | Status: SHIPPED | OUTPATIENT
Start: 2024-02-10

## 2024-02-10 RX ORDER — ZOLPIDEM TARTRATE 5 MG/1
5 TABLET ORAL NIGHTLY PRN
Qty: 90 TABLET | Refills: 1 | Status: SHIPPED | OUTPATIENT
Start: 2024-02-10

## 2024-02-10 NOTE — PROGRESS NOTES
HPI:     Chief Complaint   Patient presents with    Follow - Up       Camila May is a 53 year old female presenting for:    HLD/Thyroid-> asymptomatic    Insomnia-> stable      Results for orders placed or performed in visit on 08/25/23   Specimen to Pathology, Tissue   Result Value Ref Range    Case Report       Surgical Pathology                                Case: [id number]                                  Authorizing Provider:  Rick Aguirre MD           Collected:           08/25/2023 08:25 AM          Ordering Location:     [facility]    Received:            08/25/2023 08:25 AM                                 [facility]                                                                     Pathologist:           Cirilo Gooden MD                                                         Specimen:    Oral cavity                                                                                Final Diagnosis:         Oral cavity; biopsy:  Squamous mucosa with underlying fibrosis.  No malignancy identified.        Embedded Images      Clinical Information       K13.79 Lesion Of Hard Palate.         Gross Description       The specimen is labeled \"Lalo, oral cavity\" is received in formalin and consists a 0.6 cm tan segment of soft tissue which is trisected. Submitted entirely (A1). The margin is inked blue.  (rk)    Cirilo Gooden M.D./mtt        Interpretation Benign         Labs:   Lab Results   Component Value Date/Time    A1C 5.6 08/19/2023 09:57 AM      Lab Results   Component Value Date/Time    CHOLEST 160 08/19/2023 09:57 AM    HDL 78 (H) 08/19/2023 09:57 AM    TRIG 66 08/19/2023 09:57 AM    LDL 69 08/19/2023 09:57 AM    NONHDLC 82 08/19/2023 09:57 AM       Lab Results   Component Value Date/Time    GLU 86 08/19/2023 09:57 AM     08/19/2023 09:57 AM    K 4.0 08/19/2023 09:57 AM    CL  107 08/19/2023 09:57 AM    CO2 27.0 08/19/2023 09:57 AM    CREATSERUM 0.65 08/19/2023 09:57 AM    CA 8.8 08/19/2023 09:57 AM    ALB 3.7 08/19/2023 09:57 AM    TP 7.0 08/19/2023 09:57 AM    ALKPHO 113 (H) 08/19/2023 09:57 AM    AST 24 08/19/2023 09:57 AM    ALT 23 08/19/2023 09:57 AM    BILT 1.3 08/19/2023 09:57 AM    TSH 1.120 08/19/2023 09:57 AM    T4F 1.06 12/16/2019 12:00 AM          Medications:  Current Outpatient Medications   Medication Sig Dispense Refill    simvastatin 20 MG Oral Tab Take 1 tablet (20 mg total) by mouth nightly. 90 tablet 1    zolpidem 5 MG Oral Tab Take 1 tablet (5 mg total) by mouth nightly as needed for Sleep. 90 tablet 1    levothyroxine 125 MCG Oral Tab Take 0.5 tablets (62.5 mcg total) by mouth before breakfast. 90 tablet 3      Past Medical History:   Diagnosis Date    Hypothyroidism     Screen for colon cancer 2023    repeat CLN in 2033         Past Surgical History:   Procedure Laterality Date    TUBAL LIGATION      US FNA THYROID, GUIDE INCLD, FIRST LESION (CPT=10005)  05/15/2015    benign thyroid nodule     No Known Allergies   Social History:  Social History     Socioeconomic History    Marital status:    Tobacco Use    Smoking status: Never    Smokeless tobacco: Never   Substance and Sexual Activity    Alcohol use: No    Drug use: No      Family History:  Family History   Problem Relation Age of Onset    Stroke Father     Anemia Father     Thyroid disease Father     Cancer Mother         oral CA    Hypertension Mother     Gastro-Intestinal Disorder Mother         GERD    No Known Problems Daughter     No Known Problems Son     No Known Problems Sister     No Known Problems Brother     No Known Problems Daughter     No Known Problems Sister     No Known Problems Sister     No Known Problems Brother     No Known Problems Brother     Cancer Paternal Aunt         GI CA          REVIEW OF SYSTEMS:   Review of Systems   Constitutional:  Negative for chills, fatigue and fever.    Respiratory:  Negative for cough and shortness of breath.    Cardiovascular:  Negative for chest pain, palpitations and leg swelling.   Gastrointestinal:  Negative for abdominal pain, constipation, diarrhea and vomiting.   Neurological:  Negative for headaches.   Psychiatric/Behavioral:  Positive for sleep disturbance.             PHYSICAL EXAM:   BP 98/62   Pulse 63   Temp 98.4 °F (36.9 °C)   Ht 5' 2.21\" (1.58 m)   Wt 145 lb (65.8 kg)   SpO2 96%   BMI 26.34 kg/m²  Estimated body mass index is 26.34 kg/m² as calculated from the following:    Height as of this encounter: 5' 2.21\" (1.58 m).    Weight as of this encounter: 145 lb (65.8 kg).     Wt Readings from Last 3 Encounters:   02/10/24 145 lb (65.8 kg)   08/19/23 149 lb (67.6 kg)   05/03/23 147 lb (66.7 kg)       Physical Exam  Vitals reviewed.   Constitutional:       General: She is not in acute distress.     Appearance: She is well-developed.   Cardiovascular:      Rate and Rhythm: Normal rate and regular rhythm.      Heart sounds: Normal heart sounds. No murmur heard.  Pulmonary:      Effort: Pulmonary effort is normal. No respiratory distress.      Breath sounds: Normal breath sounds.   Abdominal:      General: Bowel sounds are normal. There is no distension.      Palpations: Abdomen is soft.      Tenderness: There is no abdominal tenderness.   Musculoskeletal:      Right lower leg: No edema.      Left lower leg: No edema.   Neurological:      General: No focal deficit present.             ASSESSMENT AND PLAN:   Patient is a 53 year old female who presents primarily presents for:    Diagnoses and all orders for this visit:    Hypothyroidism, unspecified type  -CPM    Hypercholesteremia  -     simvastatin 20 MG Oral Tab; Take 1 tablet (20 mg total) by mouth nightly.    Psychophysiological insomnia  -     zolpidem 5 MG Oral Tab; Take 1 tablet (5 mg total) by mouth nightly as needed for Sleep.    Need for vaccination  -     Fluzone Quadrivalent 6mo+  0.5mL                Return in about 6 months (around 8/10/2024) for physical.  Patient indicates understanding of the above recommendations and agrees to the above plan.  Reasurrance and education provided. All questions answered.  Notified to call with any questions, complications, allergies, or worsening or changing symptoms as well as any side effects or complications from the treatments .  Red flags/ ER precautions discussed.    Spent 30 minutes including chart review, reviewing appropriate medical history, evaluating patient, discussing treatment options, counseling and education (diet and exercise), ordering appropriate diagnostic tests and completing documentation.          Meds & Refills for this Visit:  Requested Prescriptions     Signed Prescriptions Disp Refills    simvastatin 20 MG Oral Tab 90 tablet 1     Sig: Take 1 tablet (20 mg total) by mouth nightly.    zolpidem 5 MG Oral Tab 90 tablet 1     Sig: Take 1 tablet (5 mg total) by mouth nightly as needed for Sleep.       Orders Placed This Encounter   Procedures    Fluzone Quadrivalent 6mo+ 0.5mL       Imaging & Consults:  INFLUENZA VAC, QUAD, PRSV FREE, 0.5 ML    Health Maintenance:  Colonoscopy Never done  Annual Depression Screen due on 07/28/2021  FIT/FOBT Colorectal Screening due on 01/28/2022  Annual Physical due on 08/04/2022  Mammogram due on 09/02/2022  Pap Smear,5 Years due on 08/04/2026  Influenza Vaccine Completed  Zoster Vaccines Completed  COVID-19 Vaccine Completed  Pneumococcal Vaccine: Birth to 64yrs Aged Out      Stephanie Abdi MD

## 2024-09-04 DIAGNOSIS — F51.04 PSYCHOPHYSIOLOGICAL INSOMNIA: ICD-10-CM

## 2024-09-04 DIAGNOSIS — E03.9 HYPOTHYROIDISM, UNSPECIFIED TYPE: ICD-10-CM

## 2024-09-05 RX ORDER — ZOLPIDEM TARTRATE 5 MG/1
5 TABLET ORAL NIGHTLY PRN
Qty: 60 TABLET | Refills: 0 | Status: SHIPPED | OUTPATIENT
Start: 2024-09-05

## 2024-09-05 RX ORDER — LEVOTHYROXINE SODIUM 125 UG/1
TABLET ORAL
Qty: 90 TABLET | Refills: 0 | Status: SHIPPED | OUTPATIENT
Start: 2024-09-05

## 2024-10-28 ENCOUNTER — OFFICE VISIT (OUTPATIENT)
Dept: INTERNAL MEDICINE CLINIC | Facility: CLINIC | Age: 54
End: 2024-10-28
Payer: COMMERCIAL

## 2024-10-28 VITALS
OXYGEN SATURATION: 98 % | SYSTOLIC BLOOD PRESSURE: 90 MMHG | HEIGHT: 62.21 IN | DIASTOLIC BLOOD PRESSURE: 68 MMHG | WEIGHT: 151 LBS | TEMPERATURE: 98 F | BODY MASS INDEX: 27.44 KG/M2 | HEART RATE: 74 BPM

## 2024-10-28 DIAGNOSIS — E03.9 HYPOTHYROIDISM, UNSPECIFIED TYPE: ICD-10-CM

## 2024-10-28 DIAGNOSIS — Z23 NEED FOR INFLUENZA VACCINATION: ICD-10-CM

## 2024-10-28 DIAGNOSIS — E78.00 HYPERCHOLESTEREMIA: ICD-10-CM

## 2024-10-28 DIAGNOSIS — Z78.0 POSTMENOPAUSAL: ICD-10-CM

## 2024-10-28 DIAGNOSIS — Z12.31 ENCOUNTER FOR SCREENING MAMMOGRAM FOR MALIGNANT NEOPLASM OF BREAST: ICD-10-CM

## 2024-10-28 DIAGNOSIS — Z00.00 WELLNESS EXAMINATION: Primary | ICD-10-CM

## 2024-10-28 DIAGNOSIS — F51.04 PSYCHOPHYSIOLOGICAL INSOMNIA: ICD-10-CM

## 2024-10-28 RX ORDER — ZOLPIDEM TARTRATE 5 MG/1
5 TABLET ORAL NIGHTLY PRN
Qty: 90 TABLET | Refills: 1 | Status: SHIPPED | OUTPATIENT
Start: 2024-10-28

## 2024-10-28 NOTE — PROGRESS NOTES
CC: Annual Physical Exam    HPI:   Camila May is a 54 year old female who presents for a complete physical exam.    HCM  -Diet:  Well-balanced.   -Exercise: regularly  -Mental Health: denies any depression or anxiety sx  -Skin care:  no concerning lesions/moles  -Menopause:  no issues    HLD/Thyroid-> asymptomatic     Insomnia-> stable    Wt Readings from Last 6 Encounters:   10/28/24 151 lb (68.5 kg)   02/10/24 145 lb (65.8 kg)   08/19/23 149 lb (67.6 kg)   05/03/23 147 lb (66.7 kg)   04/13/23 149 lb (67.6 kg)   02/08/23 151 lb (68.5 kg)     Body mass index is 27.43 kg/m².     Results for orders placed or performed in visit on 08/25/23   Specimen to Pathology, Tissue    Collection Time: 08/25/23  8:25 AM   Result Value Ref Range    Case Report       Surgical Pathology                                Case: [id number]                                  Authorizing Provider:  Rick Aguirre MD           Collected:           08/25/2023 08:25 AM          Ordering Location:     [facility]    Received:            08/25/2023 08:25 AM                                 [facility]                                                                     Pathologist:           Cirilo Gooden MD                                                         Specimen:    Oral cavity                                                                                Final Diagnosis:         Oral cavity; biopsy:  Squamous mucosa with underlying fibrosis.  No malignancy identified.        Embedded Images      Clinical Information       K13.79 Lesion Of Hard Palate.         Gross Description       The specimen is labeled \"Lalo, oral cavity\" is received in formalin and consists a 0.6 cm tan segment of soft tissue which is trisected. Submitted entirely (A1). The margin is inked blue.  (rk)    Cirilo Gooden M.D./mtt        Interpretation  Benign         Current Outpatient Medications   Medication Sig Dispense Refill    zolpidem 5 MG Oral Tab Take 1 tablet (5 mg total) by mouth nightly as needed for Sleep. No further refills until appt 90 tablet 1    levothyroxine 125 MCG Oral Tab TAKE 0.5 TABLET BY MOUTH BEFORE BREAKFAST 90 tablet 0    simvastatin 20 MG Oral Tab Take 1 tablet (20 mg total) by mouth nightly. 90 tablet 1      Allergies[1]   Past Medical History:    Hypothyroidism    Screen for colon cancer    repeat CLN in 2033      Past Surgical History:   Procedure Laterality Date    Tubal ligation      Us fna thyroid, guide incld, first lesion (cpt=10005)  05/15/2015    benign thyroid nodule      Family History   Problem Relation Age of Onset    Stroke Father     Anemia Father     Thyroid disease Father     Cancer Mother         oral CA    Hypertension Mother     Gastro-Intestinal Disorder Mother         GERD    No Known Problems Daughter     No Known Problems Son     No Known Problems Sister     No Known Problems Brother     No Known Problems Daughter     No Known Problems Sister     No Known Problems Sister     No Known Problems Brother     No Known Problems Brother     Cancer Paternal Aunt         GI CA      Social History:   Social History     Socioeconomic History    Marital status:    Tobacco Use    Smoking status: Never    Smokeless tobacco: Never   Vaping Use    Vaping status: Never Used   Substance and Sexual Activity    Alcohol use: No    Drug use: No            REVIEW OF SYSTEMS:   Review of Systems   Constitutional:  Negative for fatigue and fever.   Respiratory:  Negative for cough and shortness of breath.    Cardiovascular:  Negative for chest pain, palpitations and leg swelling.   Gastrointestinal:  Negative for abdominal pain, constipation, diarrhea and vomiting.   Musculoskeletal:  Negative for arthralgias.   Neurological:  Negative for headaches.            PHYSICAL EXAM:   BP 90/68   Pulse 74   Temp 98.1 °F (36.7 °C)    Ht 5' 2.21\" (1.58 m)   Wt 151 lb (68.5 kg)   SpO2 98%   BMI 27.43 kg/m²  Estimated body mass index is 27.43 kg/m² as calculated from the following:    Height as of this encounter: 5' 2.21\" (1.58 m).    Weight as of this encounter: 151 lb (68.5 kg).     Wt Readings from Last 3 Encounters:   10/28/24 151 lb (68.5 kg)   02/10/24 145 lb (65.8 kg)   08/19/23 149 lb (67.6 kg)       Physical Exam  Vitals reviewed.   Constitutional:       General: She is not in acute distress.     Appearance: She is well-developed.   HENT:      Head: Normocephalic.      Right Ear: Tympanic membrane and external ear normal.      Left Ear: Tympanic membrane and external ear normal.   Eyes:      Conjunctiva/sclera: Conjunctivae normal.      Pupils: Pupils are equal, round, and reactive to light.   Neck:      Thyroid: No thyromegaly.   Cardiovascular:      Rate and Rhythm: Normal rate and regular rhythm.      Heart sounds: Normal heart sounds. No murmur heard.  Pulmonary:      Effort: Pulmonary effort is normal. No respiratory distress.      Breath sounds: Normal breath sounds.   Abdominal:      General: Bowel sounds are normal. There is no distension.      Palpations: Abdomen is soft.      Tenderness: There is no abdominal tenderness.   Musculoskeletal:         General: Normal range of motion.      Cervical back: Normal range of motion and neck supple.      Right lower leg: No edema.      Left lower leg: No edema.   Skin:     Findings: No rash.   Neurological:      General: No focal deficit present.      Cranial Nerves: No cranial nerve deficit.           ASSESSMENT AND PLAN:   Camila May is a 54 year old female who presents for a complete physical exam.    Health maintenance, will check:   Orders Placed This Encounter   Procedures    CBC With Differential With Platelet    Comp Metabolic Panel (14)    Hemoglobin A1C    Lipid Panel    TSH W Reflex To Free T4    Urinalysis, Routine    Fluzone trivalent vaccine, PF 0.5mL, 6mo+ (24522)        Diagnoses and all orders for this visit:    Wellness examination  -     CBC With Differential With Platelet  -     Comp Metabolic Panel (14); Future  -     Hemoglobin A1C; Future  -     Lipid Panel; Future  -     TSH W Reflex To Free T4; Future  -     Urinalysis, Routine; Future  -     Pt reassured and all questions answered.  -     Age/sex specific preventive measures/immunizations reviewed and discussed with pt.  -     Pt counseled with regards to diet and exercise.    Need for influenza vaccination  -     Fluzone trivalent vaccine, PF 0.5mL, 6mo+ (21637)    Encounter for screening mammogram for malignant neoplasm of breast  -     [facility] DIONTE 2D+3D SCREENING BILAT (CPT=77067/16392); Future    Psychophysiological insomnia  -     zolpidem 5 MG Oral Tab; Take 1 tablet (5 mg total) by mouth nightly as needed for Sleep. No further refills until appt    Postmenopausal  -     XR DEXA BONE DENSITOMETRY (CPT=77080); Future    Hypothyroidism, unspecified type  -CPM pending labs    Hypercholesteremia  -CPM pending labs              Health Maintenance Due   Topic Date Due    Annual Physical  08/19/2024    Mammogram  09/23/2024    Influenza Vaccine (1) 10/01/2024         The patient indicates understanding of these issues and agrees to the plan.  Return in about 6 months (around 4/28/2025) for follow-up.  Reasurrance and education provided. All questions answered. Red flags/ ER precautions discussed.      Spent 30 minutes (10 in preventative and 20 in acute/chronic)  including chart review, reviewing appropriate medical history, evaluating patient, discussing treatment options, counseling and education (diet and exercise), ordering appropriate diagnostic tests and completing documentation.           [1] No Known Allergies

## 2024-11-02 ENCOUNTER — LAB ENCOUNTER (OUTPATIENT)
Dept: LAB | Facility: HOSPITAL | Age: 54
End: 2024-11-02
Attending: FAMILY MEDICINE
Payer: COMMERCIAL

## 2024-11-02 DIAGNOSIS — Z00.00 WELLNESS EXAMINATION: ICD-10-CM

## 2024-11-02 DIAGNOSIS — E78.00 HYPERCHOLESTEREMIA: ICD-10-CM

## 2024-11-02 DIAGNOSIS — E03.9 HYPOTHYROIDISM, UNSPECIFIED TYPE: ICD-10-CM

## 2024-11-02 LAB
ALBUMIN SERPL-MCNC: 4.3 G/DL (ref 3.2–4.8)
ALBUMIN/GLOB SERPL: 1.5 {RATIO} (ref 1–2)
ALP LIVER SERPL-CCNC: 110 U/L
ALT SERPL-CCNC: 12 U/L
ANION GAP SERPL CALC-SCNC: 5 MMOL/L (ref 0–18)
AST SERPL-CCNC: 23 U/L (ref ?–34)
BASOPHILS # BLD AUTO: 0.02 X10(3) UL (ref 0–0.2)
BASOPHILS NFR BLD AUTO: 0.4 %
BILIRUB SERPL-MCNC: 1.9 MG/DL (ref 0.3–1.2)
BILIRUB UR QL: NEGATIVE
BUN BLD-MCNC: 10 MG/DL (ref 9–23)
BUN/CREAT SERPL: 14.1 (ref 10–20)
CALCIUM BLD-MCNC: 9.5 MG/DL (ref 8.7–10.4)
CHLORIDE SERPL-SCNC: 108 MMOL/L (ref 98–112)
CHOLEST SERPL-MCNC: 171 MG/DL (ref ?–200)
CLARITY UR: CLEAR
CO2 SERPL-SCNC: 26 MMOL/L (ref 21–32)
COLOR UR: YELLOW
CREAT BLD-MCNC: 0.71 MG/DL
DEPRECATED RDW RBC AUTO: 43.8 FL (ref 35.1–46.3)
EGFRCR SERPLBLD CKD-EPI 2021: 100 ML/MIN/1.73M2 (ref 60–?)
EOSINOPHIL # BLD AUTO: 0.04 X10(3) UL (ref 0–0.7)
EOSINOPHIL NFR BLD AUTO: 0.7 %
ERYTHROCYTE [DISTWIDTH] IN BLOOD BY AUTOMATED COUNT: 13.2 % (ref 11–15)
EST. AVERAGE GLUCOSE BLD GHB EST-MCNC: 114 MG/DL (ref 68–126)
FASTING PATIENT LIPID ANSWER: YES
FASTING STATUS PATIENT QL REPORTED: YES
GLOBULIN PLAS-MCNC: 2.8 G/DL (ref 2–3.5)
GLUCOSE BLD-MCNC: 97 MG/DL (ref 70–99)
GLUCOSE UR-MCNC: NORMAL MG/DL
HBA1C MFR BLD: 5.6 % (ref ?–5.7)
HCT VFR BLD AUTO: 39 %
HDLC SERPL-MCNC: 72 MG/DL (ref 40–59)
HGB BLD-MCNC: 13.2 G/DL
HGB UR QL STRIP.AUTO: NEGATIVE
IMM GRANULOCYTES # BLD AUTO: 0.03 X10(3) UL (ref 0–1)
IMM GRANULOCYTES NFR BLD: 0.5 %
KETONES UR-MCNC: NEGATIVE MG/DL
LDLC SERPL CALC-MCNC: 85 MG/DL (ref ?–100)
LEUKOCYTE ESTERASE UR QL STRIP.AUTO: 25
LYMPHOCYTES # BLD AUTO: 2.41 X10(3) UL (ref 1–4)
LYMPHOCYTES NFR BLD AUTO: 43.6 %
MCH RBC QN AUTO: 30.6 PG (ref 26–34)
MCHC RBC AUTO-ENTMCNC: 33.8 G/DL (ref 31–37)
MCV RBC AUTO: 90.5 FL
MONOCYTES # BLD AUTO: 0.39 X10(3) UL (ref 0.1–1)
MONOCYTES NFR BLD AUTO: 7.1 %
NEUTROPHILS # BLD AUTO: 2.64 X10 (3) UL (ref 1.5–7.7)
NEUTROPHILS # BLD AUTO: 2.64 X10(3) UL (ref 1.5–7.7)
NEUTROPHILS NFR BLD AUTO: 47.7 %
NITRITE UR QL STRIP.AUTO: NEGATIVE
NONHDLC SERPL-MCNC: 99 MG/DL (ref ?–130)
OSMOLALITY SERPL CALC.SUM OF ELEC: 287 MOSM/KG (ref 275–295)
PH UR: 7.5 [PH] (ref 5–8)
PLATELET # BLD AUTO: 217 10(3)UL (ref 150–450)
POTASSIUM SERPL-SCNC: 4 MMOL/L (ref 3.5–5.1)
PROT SERPL-MCNC: 7.1 G/DL (ref 5.7–8.2)
PROT UR-MCNC: NEGATIVE MG/DL
RBC # BLD AUTO: 4.31 X10(6)UL
SODIUM SERPL-SCNC: 139 MMOL/L (ref 136–145)
SP GR UR STRIP: 1.02 (ref 1–1.03)
TRIGL SERPL-MCNC: 76 MG/DL (ref 30–149)
TSI SER-ACNC: 1.48 UIU/ML (ref 0.55–4.78)
UROBILINOGEN UR STRIP-ACNC: NORMAL
VLDLC SERPL CALC-MCNC: 12 MG/DL (ref 0–30)
WBC # BLD AUTO: 5.5 X10(3) UL (ref 4–11)

## 2024-11-02 PROCEDURE — 83036 HEMOGLOBIN GLYCOSYLATED A1C: CPT

## 2024-11-02 PROCEDURE — 84443 ASSAY THYROID STIM HORMONE: CPT

## 2024-11-02 PROCEDURE — 85025 COMPLETE CBC W/AUTO DIFF WBC: CPT | Performed by: FAMILY MEDICINE

## 2024-11-02 PROCEDURE — 80053 COMPREHEN METABOLIC PANEL: CPT

## 2024-11-02 PROCEDURE — 80061 LIPID PANEL: CPT

## 2024-11-02 PROCEDURE — 36415 COLL VENOUS BLD VENIPUNCTURE: CPT | Performed by: FAMILY MEDICINE

## 2024-11-02 PROCEDURE — 81001 URINALYSIS AUTO W/SCOPE: CPT

## 2024-11-25 ENCOUNTER — TELEPHONE (OUTPATIENT)
Dept: INTERNAL MEDICINE CLINIC | Facility: CLINIC | Age: 54
End: 2024-11-25

## 2024-11-25 NOTE — TELEPHONE ENCOUNTER
Spoke with spouse ( HIPAA authorized) Sree, patient was next to him listening we discussed test results and recommendations were made. Patient verbalized understanding no further questions at this time.

## 2024-11-25 NOTE — TELEPHONE ENCOUNTER
----- Message from Stephanie Abdi sent at 11/24/2024  7:33 PM CST -----  Please notify pt of the results as they have not read the [employer] message

## 2024-11-26 DIAGNOSIS — F51.04 PSYCHOPHYSIOLOGICAL INSOMNIA: ICD-10-CM

## 2024-11-26 RX ORDER — ZOLPIDEM TARTRATE 5 MG/1
5 TABLET ORAL NIGHTLY PRN
Qty: 60 TABLET | Refills: 0 | OUTPATIENT
Start: 2024-11-26

## 2024-12-18 ENCOUNTER — HOSPITAL ENCOUNTER (OUTPATIENT)
Dept: MAMMOGRAPHY | Facility: HOSPITAL | Age: 54
Discharge: HOME | End: 2024-12-18
Attending: FAMILY MEDICINE
Payer: COMMERCIAL

## 2024-12-18 DIAGNOSIS — Z12.31 ENCOUNTER FOR SCREENING MAMMOGRAM FOR MALIGNANT NEOPLASM OF BREAST: ICD-10-CM

## 2024-12-18 PROCEDURE — 77067 SCR MAMMO BI INCL CAD: CPT | Performed by: FAMILY MEDICINE

## 2024-12-18 PROCEDURE — 77063 BREAST TOMOSYNTHESIS BI: CPT | Performed by: FAMILY MEDICINE

## 2025-04-21 DIAGNOSIS — F51.04 PSYCHOPHYSIOLOGICAL INSOMNIA: ICD-10-CM

## 2025-04-21 DIAGNOSIS — E78.00 HYPERCHOLESTEREMIA: ICD-10-CM

## 2025-04-22 RX ORDER — SIMVASTATIN 20 MG
20 TABLET ORAL NIGHTLY
Qty: 90 TABLET | Refills: 1 | Status: SHIPPED | OUTPATIENT
Start: 2025-04-22

## 2025-04-29 ENCOUNTER — OFFICE VISIT (OUTPATIENT)
Age: 55
End: 2025-04-29
Payer: COMMERCIAL

## 2025-04-29 ENCOUNTER — LAB ENCOUNTER (OUTPATIENT)
Dept: LAB | Age: 55
End: 2025-04-29
Attending: FAMILY MEDICINE
Payer: COMMERCIAL

## 2025-04-29 VITALS
OXYGEN SATURATION: 97 % | HEIGHT: 62.21 IN | BODY MASS INDEX: 27.44 KG/M2 | SYSTOLIC BLOOD PRESSURE: 92 MMHG | DIASTOLIC BLOOD PRESSURE: 60 MMHG | WEIGHT: 151 LBS | TEMPERATURE: 97 F | HEART RATE: 68 BPM

## 2025-04-29 DIAGNOSIS — S01.319A TEAR OF EARLOBE, UNSPECIFIED LATERALITY, INITIAL ENCOUNTER: Primary | ICD-10-CM

## 2025-04-29 DIAGNOSIS — E03.9 HYPOTHYROIDISM, UNSPECIFIED TYPE: ICD-10-CM

## 2025-04-29 DIAGNOSIS — R22.41 KNEE MASS, RIGHT: ICD-10-CM

## 2025-04-29 DIAGNOSIS — R17 ELEVATED BILIRUBIN: ICD-10-CM

## 2025-04-29 DIAGNOSIS — F51.04 PSYCHOPHYSIOLOGICAL INSOMNIA: ICD-10-CM

## 2025-04-29 DIAGNOSIS — Z23 NEED FOR VACCINATION: ICD-10-CM

## 2025-04-29 LAB
ALBUMIN SERPL-MCNC: 4.5 G/DL (ref 3.2–4.8)
ALP LIVER SERPL-CCNC: 120 U/L (ref 46–118)
ALT SERPL-CCNC: 16 U/L (ref 10–49)
AST SERPL-CCNC: 26 U/L (ref ?–34)
BILIRUB DIRECT SERPL-MCNC: 0.3 MG/DL (ref ?–0.3)
BILIRUB SERPL-MCNC: 1.1 MG/DL (ref 0.3–1.2)
PROT SERPL-MCNC: 7.2 G/DL (ref 5.7–8.2)
TSI SER-ACNC: 1.55 UIU/ML (ref 0.55–4.78)

## 2025-04-29 PROCEDURE — 84443 ASSAY THYROID STIM HORMONE: CPT

## 2025-04-29 PROCEDURE — 80076 HEPATIC FUNCTION PANEL: CPT

## 2025-04-29 PROCEDURE — 36415 COLL VENOUS BLD VENIPUNCTURE: CPT

## 2025-04-29 RX ORDER — ZOLPIDEM TARTRATE 5 MG/1
5 TABLET ORAL NIGHTLY PRN
Qty: 90 TABLET | Refills: 0 | OUTPATIENT
Start: 2025-04-29

## 2025-04-29 RX ORDER — ZOLPIDEM TARTRATE 5 MG/1
5 TABLET ORAL NIGHTLY PRN
Qty: 90 TABLET | Refills: 1 | Status: SHIPPED | OUTPATIENT
Start: 2025-04-29

## 2025-04-29 NOTE — PROGRESS NOTES
HPI:     Chief Complaint   Patient presents with    Medication Follow-Up       Camila May is a 55 year old female presenting for:      Right knee movable mass  -for the past 2mo  -no pain or ROM  -no overlying  swelling or redness  -jsut happened to notice it but asymptomatic    Earlobes torn-> interested in repair    Insomnia  -needs refill    Thryoid-> asymptomatic    Results for orders placed or performed in visit on 11/02/24   Comp Metabolic Panel (14)    Collection Time: 11/02/24  9:10 AM   Result Value Ref Range    Glucose 97 70 - 99 mg/dL    Sodium 139 136 - 145 mmol/L    Potassium 4.0 3.5 - 5.1 mmol/L    Chloride 108 98 - 112 mmol/L    CO2 26.0 21.0 - 32.0 mmol/L    Anion Gap 5 0 - 18 mmol/L    BUN 10 9 - 23 mg/dL    Creatinine 0.71 0.55 - 1.02 mg/dL    BUN/CREA Ratio 14.1 10.0 - 20.0    Calcium, Total 9.5 8.7 - 10.4 mg/dL    Calculated Osmolality 287 275 - 295 mOsm/kg    eGFR-Cr 100 >=60 mL/min/1.73m2    ALT 12 10 - 49 U/L    AST 23 <34 U/L    Alkaline Phosphatase 110 (H) 41 - 108 U/L    Bilirubin, Total 1.9 (H) 0.3 - 1.2 mg/dL    Total Protein 7.1 5.7 - 8.2 g/dL    Albumin 4.3 3.2 - 4.8 g/dL    Globulin  2.8 2.0 - 3.5 g/dL    A/G Ratio 1.5 1.0 - 2.0    Patient Fasting for CMP? Yes    Hemoglobin A1C    Collection Time: 11/02/24  9:10 AM   Result Value Ref Range    HgbA1C 5.6 <5.7 %    Estimated Average Glucose 114 68 - 126 mg/dL   Lipid Panel    Collection Time: 11/02/24  9:10 AM   Result Value Ref Range    Cholesterol, Total 171 <200 mg/dL    HDL Cholesterol 72 (H) 40 - 59 mg/dL    Triglycerides 76 30 - 149 mg/dL    LDL Cholesterol 85 <100 mg/dL    VLDL 12 0 - 30 mg/dL    Non HDL Chol 99 <130 mg/dL    Patient Fasting for Lipid? Yes    TSH W Reflex To Free T4    Collection Time: 11/02/24  9:10 AM   Result Value Ref Range    TSH 1.480 0.550 - 4.780 uIU/mL   Urinalysis, Routine    Collection Time: 11/02/24  9:10 AM   Result Value Ref Range    Urine Color Yellow Yellow    Clarity Urine Clear Clear     Spec Gravity 1.020 1.005 - 1.030    Glucose Urine Normal Normal mg/dL    Bilirubin Urine Negative Negative    Ketones Urine Negative Negative mg/dL    Blood Urine Negative Negative    pH Urine 7.5 5.0 - 8.0    Protein Urine Negative Negative mg/dL    Urobilinogen Urine Normal Normal    Nitrite Urine Negative Negative    Leukocyte Esterase Urine 25 (A) Negative    WBC Urine 1-5 0 - 5 /HPF    RBC Urine 0-2 0 - 2 /HPF    Bacteria Urine None Seen None Seen /HPF    Squamous Epi. Cells Few (A) None Seen /HPF    Renal Tubular Epithelial Cells None Seen None Seen /HPF    Transitional Cells None Seen None Seen /HPF    Yeast Urine None Seen None Seen /HPF       Labs:   Lab Results   Component Value Date/Time    A1C 5.6 11/02/2024 09:10 AM      Lab Results   Component Value Date/Time    CHOLEST 171 11/02/2024 09:10 AM    HDL 72 (H) 11/02/2024 09:10 AM    TRIG 76 11/02/2024 09:10 AM    LDL 85 11/02/2024 09:10 AM    NONHDLC 99 11/02/2024 09:10 AM       Lab Results   Component Value Date/Time    GLU 97 11/02/2024 09:10 AM     11/02/2024 09:10 AM    K 4.0 11/02/2024 09:10 AM     11/02/2024 09:10 AM    CO2 26.0 11/02/2024 09:10 AM    CREATSERUM 0.71 11/02/2024 09:10 AM    CA 9.5 11/02/2024 09:10 AM    ALB 4.5 04/29/2025 02:12 PM    TP 7.2 04/29/2025 02:12 PM    ALKPHO 120 (H) 04/29/2025 02:12 PM    AST 26 04/29/2025 02:12 PM    ALT 16 04/29/2025 02:12 PM    BILT 1.1 04/29/2025 02:12 PM    TSH 1.552 04/29/2025 02:12 PM    T4F 1.06 12/16/2019 12:00 AM          Medications:  Current Medications[1]   Past Medical History[2]      Past Surgical History[3]  Allergies[4]   Social History:  Short Social Hx on File[5]   Family History:  Family History[6]       REVIEW OF SYSTEMS:   Review of Systems   Constitutional:  Negative for fatigue and fever.   Respiratory:  Negative for cough and shortness of breath.    Cardiovascular:  Negative for chest pain, palpitations and leg swelling.   Gastrointestinal:  Negative for abdominal  pain, constipation, diarrhea and vomiting.   Musculoskeletal:  Negative for arthralgias.   Neurological:  Negative for headaches.   Psychiatric/Behavioral:  Positive for sleep disturbance.             PHYSICAL EXAM:   BP 92/60   Pulse 68   Temp 97.2 °F (36.2 °C)   Ht 5' 2.21\" (1.58 m)   Wt 151 lb (68.5 kg)   SpO2 97%   BMI 27.43 kg/m²  Estimated body mass index is 27.43 kg/m² as calculated from the following:    Height as of this encounter: 5' 2.21\" (1.58 m).    Weight as of this encounter: 151 lb (68.5 kg).     Wt Readings from Last 3 Encounters:   04/29/25 151 lb (68.5 kg)   10/28/24 151 lb (68.5 kg)   02/10/24 145 lb (65.8 kg)       Physical Exam  Vitals reviewed.   Constitutional:       General: She is not in acute distress.     Appearance: She is well-developed.   Cardiovascular:      Rate and Rhythm: Normal rate and regular rhythm.      Heart sounds: Normal heart sounds. No murmur heard.  Pulmonary:      Effort: Pulmonary effort is normal. No respiratory distress.      Breath sounds: Normal breath sounds.   Abdominal:      General: Bowel sounds are normal. There is no distension.      Palpations: Abdomen is soft.      Tenderness: There is no abdominal tenderness.   Musculoskeletal:      Right lower leg: No edema.      Left lower leg: No edema.   Neurological:      General: No focal deficit present.             ASSESSMENT AND PLAN:   Patient is a 55 year old female who presents primarily presents for:    Diagnoses and all orders for this visit:    Tear of earlobe, unspecified laterality, initial encounter  -     ENT Referral - [facility])    Psychophysiological insomnia  -     zolpidem 5 MG Oral Tab; Take 1 tablet (5 mg total) by mouth nightly as needed for Sleep. No further refills until appt    Hypothyroidism, unspecified type  -     TSH W Reflex To Free T4; Future  -CPM    Elevated bilirubin  -     Hepatic Function Panel (7) [E]; Future    Need for vaccination  -     PCV20 (Prevnar  20)    Knee mass, right  -     XR KNEE (1 OR 2 VIEWS), RIGHT (CPT=73560); Future           Return in about 6 months (around 10/29/2025) for physical.  Patient indicates understanding of the above recommendations and agrees to the above plan.  Reasurrance and education provided. All questions answered.  Notified to call with any questions, complications, allergies, or worsening or changing symptoms as well as any side effects or complications from the treatments .  Red flags/ ER precautions discussed.    Spent 40 minutes including chart review, reviewing appropriate medical history, evaluating patient, discussing treatment options, counseling and education (diet and exercise), ordering appropriate diagnostic tests and completing documentation.        Meds & Refills for this Visit:  Requested Prescriptions     Signed Prescriptions Disp Refills    zolpidem 5 MG Oral Tab 90 tablet 1     Sig: Take 1 tablet (5 mg total) by mouth nightly as needed for Sleep. No further refills until appt       Orders Placed This Encounter   Procedures    Hepatic Function Panel (7) [E]    TSH W Reflex To Free T4    PCV20 (Prevnar 20)       Imaging & Consults:  ENT - INTERNAL  PCV20 VACCINE FOR INTRAMUSCULAR USE  XR KNEE (1 OR 2 VIEWS), RIGHT (CPT=73560)    Health Maintenance:  Health Maintenance   Topic Date Due    Pneumococcal Vaccine: 50+ Years (1 of 1 - PCV) Never done    COVID-19 Vaccine (4 - 2024-25 season) 09/01/2024    Annual Depression Screening  01/01/2025    Annual Physical  10/28/2025    Mammogram  12/18/2025    Pap Smear  08/04/2026    DTaP,Tdap,and Td Vaccines (2 - Td or Tdap) 03/28/2027    Colorectal Cancer Screening  06/19/2033    Influenza Vaccine  Completed    Zoster Vaccines  Completed    Meningococcal B Vaccine  Aged Out         Stephanie Abdi MD                  [1]   Current Outpatient Medications   Medication Sig Dispense Refill    zolpidem 5 MG Oral Tab Take 1 tablet (5 mg total) by mouth nightly as needed  for Sleep. No further refills until appt 90 tablet 1    simvastatin 20 MG Oral Tab TAKE 1 TABLET(20 MG) BY MOUTH EVERY NIGHT 90 tablet 1    levothyroxine 125 MCG Oral Tab TAKE 0.5 TABLET BY MOUTH BEFORE BREAKFAST 90 tablet 3   [2]   Past Medical History:   Hypothyroidism    Screen for colon cancer    repeat CLN in 2033   [3]   Past Surgical History:  Procedure Laterality Date    Tubal ligation      Us fna thyroid, guide incld, first lesion (cpt=10005)  05/15/2015    benign thyroid nodule   [4] No Known Allergies  [5]   Social History  Socioeconomic History    Marital status:    Tobacco Use    Smoking status: Never    Smokeless tobacco: Never   Vaping Use    Vaping status: Never Used   Substance and Sexual Activity    Alcohol use: No    Drug use: No   [6]   Family History  Problem Relation Age of Onset    Cancer Mother         oral CA    Hypertension Mother     Gastro-Intestinal Disorder Mother         GERD    Prostate Cancer Father 70 - 79    Stroke Father     Anemia Father     Thyroid disease Father     No Known Problems Sister     No Known Problems Sister     No Known Problems Sister     No Known Problems Brother     No Known Problems Brother     No Known Problems Brother     No Known Problems Daughter     No Known Problems Daughter     No Known Problems Son     Cancer Paternal Aunt         GI CA    Breast Cancer Neg     Ovarian Cancer Neg     Pancreatic Cancer Neg

## 2025-05-09 ENCOUNTER — HOSPITAL ENCOUNTER (OUTPATIENT)
Dept: GENERAL RADIOLOGY | Facility: HOSPITAL | Age: 55
Discharge: HOME | End: 2025-05-09
Attending: FAMILY MEDICINE
Payer: COMMERCIAL

## 2025-05-09 ENCOUNTER — OFFICE VISIT (OUTPATIENT)
Dept: OTOLARYNGOLOGY | Facility: CLINIC | Age: 55
End: 2025-05-09

## 2025-05-09 VITALS — BODY MASS INDEX: 27.44 KG/M2 | HEIGHT: 62.21 IN | WEIGHT: 151 LBS

## 2025-05-09 DIAGNOSIS — S01.319A LACERATION OF EAR LOBE, UNSPECIFIED LATERALITY, INITIAL ENCOUNTER: Primary | ICD-10-CM

## 2025-05-09 DIAGNOSIS — R22.41 KNEE MASS, RIGHT: ICD-10-CM

## 2025-05-09 PROCEDURE — 99213 OFFICE O/P EST LOW 20 MIN: CPT | Performed by: OTOLARYNGOLOGY

## 2025-05-09 PROCEDURE — 73560 X-RAY EXAM OF KNEE 1 OR 2: CPT | Performed by: FAMILY MEDICINE

## 2025-05-10 NOTE — PROGRESS NOTES
Camila May is a 55 year old female.    Chief Complaint   Patient presents with    Ear Problem     Tear of earlobe        HISTORY OF PRESENT ILLNESS  She presents with a one-year history of sensing a small bump on the back of her gingiva behind her 2 front teeth.  Now having some discomfort on her tongue tip as well.  Always probing her teeth with her tongue tip.  She used electric toothbrush for a few months last year and switch back to her regular toothbrush when she started having pain and discomfort.  Brushes her tongue daily as well as her gums daily.     3/3/20 still feels the pain discomfort in the back of her teeth despite using the mouthwash.  No other signs, symptoms or complaints.  Previously noted to have a small bump in that region.  Seems very bothered by this overall.  No other signs, symptoms or complaints at this time     6/19/2020 she presents again with continued discomfort along her gums of her upper maxilla.  Primarily occurs by the 2 front teeth but extends laterally at times and then returns back to its normal discomfort which comes and goes.  She did see a dentist who referred her to a specialist who recommended some work to her lower teeth/gums but stated that nothing appeared to be abnormal about her upper gums.  She recently saw her dentist a few weeks ago who once again reiterated that her gums look irritated superiorly.  Here for further evaluation and treatment.  No help with any medications that I have given her so far.  No other signs, symptoms or complaints     5/9/25 she presents today with a history of elongated ear piercings she feels that her earrings have pulled on the skin over wintertime and cause him to nearly tear.  Wishes to discuss having them repaired.      Social Hx on file[1]    Family History[2]    Past Medical History[3]    Past Surgical History[4]      REVIEW OF SYSTEMS    System Neg/Pos Details   Constitutional Negative Fatigue, fever and weight loss.   ENMT  Negative Drooling.   Eyes Negative Blurred vision and vision changes.   Respiratory Negative Dyspnea and wheezing.   Cardio Negative Chest pain, irregular heartbeat/palpitations and syncope.   GI Negative Abdominal pain and diarrhea.   Endocrine Negative Cold intolerance and heat intolerance.   Neuro Negative Tremors.   Psych Negative Anxiety and depression.   Integumentary Negative Frequent skin infections, pigment change and rash.   Hema/Lymph Negative Easy bleeding and easy bruising.           PHYSICAL EXAM    Ht 5' 2.21\" (1.58 m)   Wt 151 lb (68.5 kg)   BMI 27.43 kg/m²        Constitutional Normal Overall appearance - Normal.   Psychiatric Normal Orientation - Oriented to time, place, person & situation. Appropriate mood and affect.   Neck Exam Normal Inspection - Normal. Palpation - Normal. Parotid gland - Normal. Thyroid gland - Normal.   Eyes Normal Conjunctiva - Right: Normal, Left: Normal. Pupil - Right: Normal, Left: Normal. Fundus - Right: Normal, Left: Normal.   Neurological Normal Memory - Normal. Cranial nerves - Cranial nerves II through XII grossly intact.   Head/Face Normal Facial features - Normal. Eyebrows - Normal. Skull - Normal.        Nasopharynx Normal External nose - Normal. Lips/teeth/gums - Normal. Tonsils - Normal. Oropharynx - Normal.   Ears Normal Inspection - Right: Normal, Left: Normal. Canal - Right: Normal, Left: Normal. TM - Right: Normal, Left: Normal.  Torn earlobes bilaterally.   Skin Normal Inspection - Normal.        Lymph Detail Normal Submental. Submandibular. Anterior cervical. Posterior cervical. Supraclavicular.        Nose/Mouth/Throat Normal External nose - Normal. Lips/teeth/gums - Normal. Tonsils - Normal. Oropharynx - Normal.   Nose/Mouth/Throat Normal Nares - Right: Normal Left: Normal. Septum -Normal  Turbinates - Right: Normal, Left: Normal.     Medications - Current[5]  ASSESSMENT AND PLAN    1. Laceration of ear lobe, unspecified laterality, initial  encounter  Bilateral earlobe tears.  I have recommended repairing these at a later date.  I will perform a wedge excision bilaterally of the residual earlobe skin and close each earlobe primarily.  This will be done in the office under local anesthesia.  She will set up a time to have this done at her convenience.        This note was prepared using [employer] voice recognition dictation software. As a result errors may occur. When identified these errors have been corrected. While every attempt is made to correct errors during dictation discrepancies may still exist    Valdemar Rosado MD    5/10/2025    12:56 PM         [1]   Social History  Socioeconomic History    Marital status:    Tobacco Use    Smoking status: Never    Smokeless tobacco: Never   Vaping Use    Vaping status: Never Used   Substance and Sexual Activity    Alcohol use: No    Drug use: No   [2]   Family History  Problem Relation Age of Onset    Cancer Mother         oral CA    Hypertension Mother     Gastro-Intestinal Disorder Mother         GERD    Prostate Cancer Father 70 - 79    Stroke Father     Anemia Father     Thyroid disease Father     No Known Problems Sister     No Known Problems Sister     No Known Problems Sister     No Known Problems Brother     No Known Problems Brother     No Known Problems Brother     No Known Problems Daughter     No Known Problems Daughter     No Known Problems Son     Cancer Paternal Aunt         GI CA    Breast Cancer Neg     Ovarian Cancer Neg     Pancreatic Cancer Neg    [3]   Past Medical History:   Hypothyroidism    Screen for colon cancer    repeat CLN in 2033   [4]   Past Surgical History:  Procedure Laterality Date    Tubal ligation      Us fna thyroid, guide incld, first lesion (cpt=10005)  05/15/2015    benign thyroid nodule   [5]   Current Outpatient Medications:     zolpidem 5 MG Oral Tab, Take 1 tablet (5 mg total) by mouth nightly as needed for Sleep. No further refills until appt, Disp:  90 tablet, Rfl: 1    simvastatin 20 MG Oral Tab, TAKE 1 TABLET(20 MG) BY MOUTH EVERY NIGHT, Disp: 90 tablet, Rfl: 1    levothyroxine 125 MCG Oral Tab, TAKE 0.5 TABLET BY MOUTH BEFORE BREAKFAST, Disp: 90 tablet, Rfl: 3

## 2025-05-13 ENCOUNTER — HOSPITAL ENCOUNTER (OUTPATIENT)
Dept: BONE DENSITY | Age: 55
Discharge: HOME | End: 2025-05-13
Attending: FAMILY MEDICINE
Payer: COMMERCIAL

## 2025-05-13 DIAGNOSIS — Z78.0 POSTMENOPAUSAL: ICD-10-CM

## 2025-05-13 PROCEDURE — 77080 DXA BONE DENSITY AXIAL: CPT | Performed by: FAMILY MEDICINE

## 2025-05-17 ENCOUNTER — OFFICE VISIT (OUTPATIENT)
Dept: OTOLARYNGOLOGY | Facility: CLINIC | Age: 55
End: 2025-05-17
Payer: COMMERCIAL

## 2025-05-17 DIAGNOSIS — S01.319A LACERATION OF EAR LOBE, UNSPECIFIED LATERALITY, INITIAL ENCOUNTER: Primary | ICD-10-CM

## 2025-05-17 PROCEDURE — 12013 RPR F/E/E/N/L/M 2.6-5.0 CM: CPT | Performed by: OTOLARYNGOLOGY

## 2025-05-17 NOTE — PROGRESS NOTES
Camila May is a 55 year old female.    Chief Complaint   Patient presents with    Procedure     Patient is here for procedure of bilateral ear lobe repair       HISTORY OF PRESENT ILLNESS  She presents with a one-year history of sensing a small bump on the back of her gingiva behind her 2 front teeth.  Now having some discomfort on her tongue tip as well.  Always probing her teeth with her tongue tip.  She used electric toothbrush for a few months last year and switch back to her regular toothbrush when she started having pain and discomfort.  Brushes her tongue daily as well as her gums daily.     3/3/20 still feels the pain discomfort in the back of her teeth despite using the mouthwash.  No other signs, symptoms or complaints.  Previously noted to have a small bump in that region.  Seems very bothered by this overall.  No other signs, symptoms or complaints at this time     6/19/2020 she presents again with continued discomfort along her gums of her upper maxilla.  Primarily occurs by the 2 front teeth but extends laterally at times and then returns back to its normal discomfort which comes and goes.  She did see a dentist who referred her to a specialist who recommended some work to her lower teeth/gums but stated that nothing appeared to be abnormal about her upper gums.  She recently saw her dentist a few weeks ago who once again reiterated that her gums look irritated superiorly.  Here for further evaluation and treatment.  No help with any medications that I have given her so far.  No other signs, symptoms or complaints     5/9/25 she presents today with a history of elongated ear piercings she feels that her earrings have pulled on the skin over wintertime and cause him to nearly tear.  Wishes to discuss having them repaired.    5/17/25 she presents today for repair of bilateral earlobe lacerations.      Social Hx on file[1]    Family History[2]    Past Medical History[3]    Past Surgical  History[4]      REVIEW OF SYSTEMS    System Neg/Pos Details   Constitutional Negative Fatigue, fever and weight loss.   ENMT Negative Drooling.   Eyes Negative Blurred vision and vision changes.   Respiratory Negative Dyspnea and wheezing.   Cardio Negative Chest pain, irregular heartbeat/palpitations and syncope.   GI Negative Abdominal pain and diarrhea.   Endocrine Negative Cold intolerance and heat intolerance.   Neuro Negative Tremors.   Psych Negative Anxiety and depression.   Integumentary Negative Frequent skin infections, pigment change and rash.   Hema/Lymph Negative Easy bleeding and easy bruising.           PHYSICAL EXAM    There were no vitals taken for this visit.       Constitutional Normal Overall appearance - Normal.   Psychiatric Normal Orientation - Oriented to time, place, person & situation. Appropriate mood and affect.   Neck Exam Normal Inspection - Normal. Palpation - Normal. Parotid gland - Normal. Thyroid gland - Normal.   Eyes Normal Conjunctiva - Right: Normal, Left: Normal. Pupil - Right: Normal, Left: Normal. Fundus - Right: Normal, Left: Normal.   Neurological Normal Memory - Normal. Cranial nerves - Cranial nerves II through XII grossly intact.   Head/Face Normal Facial features - Normal. Eyebrows - Normal. Skull - Normal.        Nasopharynx Normal External nose - Normal. Lips/teeth/gums - Normal. Tonsils - Normal. Oropharynx - Normal.   Ears Normal Inspection - Right: Normal, Left: Normal. Canal - Right: Normal, Left: Normal. TM - Right: Normal, Left: Normal.  Bilateral earlobe lacerations.   Skin Normal Inspection - Normal.        Lymph Detail Normal Submental. Submandibular. Anterior cervical. Posterior cervical. Supraclavicular.        Nose/Mouth/Throat Normal External nose - Normal. Lips/teeth/gums - Normal. Tonsils - Normal. Oropharynx - Normal.   Nose/Mouth/Throat Normal Nares - Right: Normal Left: Normal. Septum -Normal  Turbinates - Right: Normal, Left: Normal.   Repair of  bilateral earlobe lacerations  Timeout was performed and bilateral earlobes were infiltrated with 1% Xylocaine with 1-100,000 of epinephrine.  Both earlobes were then prepped and draped in regular fashion.  Using a #11 blade a triangular portion of skin of the inferior lobe was removed removing the edges of the lacerated earlobe skin.  A simple closure was performed consisting of a running locked 5-0 fast absorbing gut suture on the anterior and posterior surfaces of the earlobe for total length of 2.6 cm.  A similar procedure was then performed on the opposite ear.  In brief a triangle portion of skin was removed and the edges of the remaining earlobe were approximated in the anterior and posterior surfaces with close in similar fashion with a total length of repair measuring 2.6 cm.  Between the 2 ears the total length of repair was 5.2 cm.  A tape and benzoin dressing was placed bilaterally.  The patient was then discharged to home.  Wound care was discussed and understood.    Medications - Current[5]  ASSESSMENT AND PLAN    1. Laceration of ear lobe, unspecified laterality, initial encounter  Bilateral earlobe lacerations repaired in the office under local anesthesia without difficulty.  Wound care discussed and understood.  I have asked return to see me as needed.  Remove dressings in about 1 week.        This note was prepared using Dragon Medical voice recognition dictation software. As a result errors may occur. When identified these errors have been corrected. While every attempt is made to correct errors during dictation discrepancies may still exist    Valdemar Rosado MD    5/17/2025    6:44 PM         [1]   Social History  Socioeconomic History    Marital status:    Tobacco Use    Smoking status: Never    Smokeless tobacco: Never   Vaping Use    Vaping status: Never Used   Substance and Sexual Activity    Alcohol use: No    Drug use: No   [2]   Family History  Problem Relation Age of Onset     Cancer Mother         oral CA    Hypertension Mother     Gastro-Intestinal Disorder Mother         GERD    Prostate Cancer Father 70 - 79    Stroke Father     Anemia Father     Thyroid disease Father     No Known Problems Sister     No Known Problems Sister     No Known Problems Sister     No Known Problems Brother     No Known Problems Brother     No Known Problems Brother     No Known Problems Daughter     No Known Problems Daughter     No Known Problems Son     Cancer Paternal Aunt         GI CA    Breast Cancer Neg     Ovarian Cancer Neg     Pancreatic Cancer Neg    [3]   Past Medical History:   Hypothyroidism    Screen for colon cancer    repeat CLN in 2033   [4]   Past Surgical History:  Procedure Laterality Date    Tubal ligation      Us fna thyroid, guide incld, first lesion (cpt=10005)  05/15/2015    benign thyroid nodule   [5]   Current Outpatient Medications:     zolpidem 5 MG Oral Tab, Take 1 tablet (5 mg total) by mouth nightly as needed for Sleep. No further refills until appt, Disp: 90 tablet, Rfl: 1    simvastatin 20 MG Oral Tab, TAKE 1 TABLET(20 MG) BY MOUTH EVERY NIGHT, Disp: 90 tablet, Rfl: 1    levothyroxine 125 MCG Oral Tab, TAKE 0.5 TABLET BY MOUTH BEFORE BREAKFAST, Disp: 90 tablet, Rfl: 3

## 2025-05-29 ENCOUNTER — OFFICE VISIT (OUTPATIENT)
Dept: OTOLARYNGOLOGY | Facility: CLINIC | Age: 55
End: 2025-05-29

## 2025-05-29 DIAGNOSIS — S01.319A LACERATION OF EAR LOBE, UNSPECIFIED LATERALITY, INITIAL ENCOUNTER: Primary | ICD-10-CM

## 2025-05-29 DIAGNOSIS — K13.79 LESION OF HARD PALATE: ICD-10-CM

## 2025-05-29 PROCEDURE — 99024 POSTOP FOLLOW-UP VISIT: CPT | Performed by: OTOLARYNGOLOGY

## 2025-05-29 NOTE — PROGRESS NOTES
Camila May is a 55 year old female.    Chief Complaint   Patient presents with    Follow - Up     F/up ear lobe repair  Pt reports no concerns since last visit       HISTORY OF PRESENT ILLNESS  She presents with a one-year history of sensing a small bump on the back of her gingiva behind her 2 front teeth.  Now having some discomfort on her tongue tip as well.  Always probing her teeth with her tongue tip.  She used electric toothbrush for a few months last year and switch back to her regular toothbrush when she started having pain and discomfort.  Brushes her tongue daily as well as her gums daily.     3/3/20 still feels the pain discomfort in the back of her teeth despite using the mouthwash.  No other signs, symptoms or complaints.  Previously noted to have a small bump in that region.  Seems very bothered by this overall.  No other signs, symptoms or complaints at this time     6/19/2020 she presents again with continued discomfort along her gums of her upper maxilla.  Primarily occurs by the 2 front teeth but extends laterally at times and then returns back to its normal discomfort which comes and goes.  She did see a dentist who referred her to a specialist who recommended some work to her lower teeth/gums but stated that nothing appeared to be abnormal about her upper gums.  She recently saw her dentist a few weeks ago who once again reiterated that her gums look irritated superiorly.  Here for further evaluation and treatment.  No help with any medications that I have given her so far.  No other signs, symptoms or complaints     5/9/25 she presents today with a history of elongated ear piercings she feels that her earrings have pulled on the skin over wintertime and cause him to nearly tear.  Wishes to discuss having them repaired.     5/17/25 she presents today for repair of bilateral earlobe lacerations.     5/29/27 doing very well.  Last visit I did repair to lacerations of her earlobe also complains  of some discomfort over the palatal mucosa.  She is seen oral surgeons endodontist.  Dentist and regular dentist even had a biopsy performed which was negative.  Etiology of her sensation of discomfort and burning in that area is unknown      Social Hx on file[1]    Family History[2]    Past Medical History[3]    Past Surgical History[4]      REVIEW OF SYSTEMS    System Neg/Pos Details   Constitutional Negative Fatigue, fever and weight loss.   ENMT Negative Drooling.   Eyes Negative Blurred vision and vision changes.   Respiratory Negative Dyspnea and wheezing.   Cardio Negative Chest pain, irregular heartbeat/palpitations and syncope.   GI Negative Abdominal pain and diarrhea.   Endocrine Negative Cold intolerance and heat intolerance.   Neuro Negative Tremors.   Psych Negative Anxiety and depression.   Integumentary Negative Frequent skin infections, pigment change and rash.   Hema/Lymph Negative Easy bleeding and easy bruising.           PHYSICAL EXAM    There were no vitals taken for this visit.       Constitutional Normal Overall appearance - Normal.   Psychiatric Normal Orientation - Oriented to time, place, person & situation. Appropriate mood and affect.   Neck Exam Normal Inspection - Normal. Palpation - Normal. Parotid gland - Normal. Thyroid gland - Normal.   Eyes Normal Conjunctiva - Right: Normal, Left: Normal. Pupil - Right: Normal, Left: Normal. Fundus - Right: Normal, Left: Normal.   Neurological Normal Memory - Normal. Cranial nerves - Cranial nerves II through XII grossly intact.   Head/Face Normal Facial features - Normal. Eyebrows - Normal. Skull - Normal.        Nasopharynx Normal External nose - Normal. Lips/teeth/gums - Normal. Tonsils - Normal. Oropharynx - Normal.   Ears Normal Inspection - Right: Normal, Left: Normal. Canal - Right: Normal, Left: Normal. TM - Right: Normal, Left: Normal.   Skin Normal Inspection - Normal.        Lymph Detail Normal Submental. Submandibular. Anterior  cervical. Posterior cervical. Supraclavicular.   Ear incisions  Clean dry and intact   Nose/Mouth/Throat Normal External nose - Normal. Lips/teeth/gums - Normal. Tonsils - Normal. Oropharynx - Normal.   Nose/Mouth/Throat Normal Nares - Right: Normal Left: Normal. Septum -Normal  Turbinates - Right: Normal, Left: Normal.     Medications - Current[5]  ASSESSMENT AND PLAN    1. Laceration of ear lobe, unspecified laterality, initial encounter  Well-healed I did recommend repierce in about 3 to 4 months.  No further intervention indicated    2. Lesion of hard palate  No visible hard palate lesion.  She is seen multiple doctors including oral surgeons endodontist periodontist with no findings noted even had a biopsy which was negative.  We discussed possibly this being some type of neuralgic process.  Return to see me as needed        This note was prepared using Dragon Medical voice recognition dictation software. As a result errors may occur. When identified these errors have been corrected. While every attempt is made to correct errors during dictation discrepancies may still exist    Valdemar Rosado MD    5/29/2025    2:12 PM         [1]   Social History  Socioeconomic History    Marital status:    Tobacco Use    Smoking status: Never    Smokeless tobacco: Never   Vaping Use    Vaping status: Never Used   Substance and Sexual Activity    Alcohol use: No    Drug use: No   [2]   Family History  Problem Relation Age of Onset    Cancer Mother         oral CA    Hypertension Mother     Gastro-Intestinal Disorder Mother         GERD    Prostate Cancer Father 70 - 79    Stroke Father     Anemia Father     Thyroid disease Father     No Known Problems Sister     No Known Problems Sister     No Known Problems Sister     No Known Problems Brother     No Known Problems Brother     No Known Problems Brother     No Known Problems Daughter     No Known Problems Daughter     No Known Problems Son     Cancer Paternal Aunt          GI CA    Breast Cancer Neg     Ovarian Cancer Neg     Pancreatic Cancer Neg    [3]   Past Medical History:   Hypothyroidism    Screen for colon cancer    repeat CLN in 2033   [4]   Past Surgical History:  Procedure Laterality Date    Tubal ligation      Us fna thyroid, guide incld, first lesion (cpt=10005)  05/15/2015    benign thyroid nodule   [5]   Current Outpatient Medications:     zolpidem 5 MG Oral Tab, Take 1 tablet (5 mg total) by mouth nightly as needed for Sleep. No further refills until appt, Disp: 90 tablet, Rfl: 1    simvastatin 20 MG Oral Tab, TAKE 1 TABLET(20 MG) BY MOUTH EVERY NIGHT, Disp: 90 tablet, Rfl: 1    levothyroxine 125 MCG Oral Tab, TAKE 0.5 TABLET BY MOUTH BEFORE BREAKFAST, Disp: 90 tablet, Rfl: 3

## 2025-06-04 DIAGNOSIS — E78.00 HYPERCHOLESTEREMIA: ICD-10-CM

## 2025-06-05 RX ORDER — SIMVASTATIN 20 MG
20 TABLET ORAL NIGHTLY
Qty: 90 TABLET | Refills: 1 | OUTPATIENT
Start: 2025-06-05

## (undated) NOTE — LETTER
AUTHORIZATION FOR SURGICAL OPERATION OR OTHER PROCEDURE    1. I hereby authorize Dr. Haritha Nieves, and [place] [employer] [facility] staff assigned to my case to perform the following operation and/or procedure at the [facility], [facility]:    _______________________________________________________________________________________________    Biopsy of midline platelet  _______________________________________________________________________________________________    2. My physician has explained the nature and purpose of the operation or other procedure, possible alternative methods of treatment, the risks involved, and the possibility of complication to me. I acknowledge that no guarantee has been made as to the result that may be obtained. 3.  I recognize that, during the course of this operation, or other procedure, unforseen conditions may necessitate additional or different procedure than those listed above. I, therefore, further authorize and request that the above named physician, his/her physician assistants or designees perform such procedures as are, in his/her professional opinion, necessary and desirable. 4.  Any tissue or organs removed in the operation or other procedure may be disposed of by and at the discretion of the [facility], [facility] and [facility] AT [facility]. 5.  I understand that in the event of a medical emergency, I will be transported by local paramedics to [facility] or other [facility] emergency department. 6.  I certify that I have read and fully understand the above consent to operation and/or other procedure. 7.  I acknowledge that my physician has explained sedation/analgesia administration to me including the risks and benefits. I consent to the administration of sedation/analgesia as may be necessary or desirable in the judgement of my physician.     Witness signature: ___________________________________________________ Date:  ______/______/_____                    Time: ________ A. M.  P.M. Patient Name:  ______________________________________________________  (please print)      Patient signature:  ___________________________________________________             Relationship to Patient:           []  Parent    Responsible person                          []  Spouse  In case of minor or                    [] Other  _____________   Incompetent name:  __________________________________________________                               (please print)      _____________      Responsible person  In case of minor or  Incompetent signature:  _______________________________________________    Statement of Physician  My signature below affirms that prior to the time of the procedure, I have explained to the patient and/or his/her guardian, the risks and benefits involved in the proposed treatment and any reasonable alternative to the proposed treatment. I have also explained the risks and benefits involved in the refusal of the proposed treatment and have answered the patient's questions.                         Date:  ______/______/_______  Provider                      Signature:  __________________________________________________________       Time:  ___________ A.M    P.M.

## (undated) NOTE — LETTER
Dr. Geraldo June MD     [address] [address]  [phone]     Martha 3, 2021      [address] [facility]      Dear

## (undated) NOTE — MR AVS SNAPSHOT
After Visit Summary   8/25/2023    Sixto Holden   MRN: [id number]           Visit Information     Date & Time  8/25/2023  7:30 AM Provider  Gallo Jeronimo MD Department  [address] [place],[address], [address] [facility],[address], [facility]. Phone  [phone]      Allergies as of 8/25/2023  Review status set to Review Complete on 8/25/2023   No Known Allergies     Your Current Medications        Dosage    simvastatin 20 MG Oral Tab Take 1 tablet (20 mg total) by mouth nightly. levothyroxine 125 MCG Oral Tab Take 0.5 tablets (62.5 mcg total) by mouth before breakfast.    zolpidem 5 MG Oral Tab Take 1 tablet (5 mg total) by mouth nightly as needed for Sleep. Diagnoses for This Visit    Lesion of hard palate   [727143]  -  Primary  Family history of oral cancer   [909841]             We Ordered the Following     Normal Orders This Visit    SURGICAL PATHOLOGY TISSUE [[id number] CUSTOM]     Future Labs/Procedures Expected by Expires    SURGICAL PATHOLOGY TISSUE [[id number] CUSTOM]  8/25/2023 8/25/2024      Future Appointments        Provider Department    2/10/2024 8:40 AM [facility], [address] [place] [address]                Did you know that [facility] primary care physicians now offer Video Visits through [address] for adult patients for a variety of conditions such as allergies, back pain and cold symptoms? Skip the drive and waiting room and online chat with a doctor face-to-face using your web-cam enabled computer or mobile device wherever you are. Video Visits cost $50 and can be paid hassle-free using a credit, debit, or health savings card. Not active on [employer]? Ask us how to get signed up today! If you receive a survey from [employer], please take a few minutes to complete it and provide feedback. We strive to deliver the best patient experience and are looking for ways to make improvements. Your feedback will help us do so.  For more information on [employer], please visit [email or web address]           No text in SmartText           No text in SmartText

## (undated) NOTE — LETTER
Ricky Goltz, [place]  [address] [facility], [facility]       01/30/20        Patient: Juan Pablo Dominguez   YOB: 1970   Date of Visit: 1/30/2020       Dear  Dr. Ricky Goltz, MD,      Thank you for referring Dixie Kenna

## (undated) NOTE — LETTER
10/14/21        Starlene Dear  [address]    Nuestros registros indican que tiene análisis clínicos pendientes y/o pruebas que se ordenaron en pinzon nombre que no se fitzpatrick completado.     Para brindarle